# Patient Record
Sex: FEMALE | Race: WHITE | NOT HISPANIC OR LATINO | Employment: OTHER | URBAN - METROPOLITAN AREA
[De-identification: names, ages, dates, MRNs, and addresses within clinical notes are randomized per-mention and may not be internally consistent; named-entity substitution may affect disease eponyms.]

---

## 2017-06-11 ENCOUNTER — APPOINTMENT (EMERGENCY)
Dept: RADIOLOGY | Facility: HOSPITAL | Age: 82
End: 2017-06-11
Payer: MEDICARE

## 2017-06-11 ENCOUNTER — HOSPITAL ENCOUNTER (EMERGENCY)
Facility: HOSPITAL | Age: 82
Discharge: HOME | End: 2017-06-12
Attending: EMERGENCY MEDICINE | Admitting: EMERGENCY MEDICINE
Payer: MEDICARE

## 2017-06-11 VITALS
SYSTOLIC BLOOD PRESSURE: 147 MMHG | RESPIRATION RATE: 18 BRPM | DIASTOLIC BLOOD PRESSURE: 76 MMHG | OXYGEN SATURATION: 92 % | HEART RATE: 71 BPM | TEMPERATURE: 98 F | WEIGHT: 135 LBS

## 2017-06-11 DIAGNOSIS — S09.90XA HEAD INJURY, INITIAL ENCOUNTER: Primary | ICD-10-CM

## 2017-06-11 DIAGNOSIS — S00.81XA ABRASION OF FOREHEAD, INITIAL ENCOUNTER: ICD-10-CM

## 2017-06-11 PROCEDURE — 73110 X-RAY EXAM OF WRIST: CPT | Performed by: EMERGENCY MEDICINE

## 2017-06-11 PROCEDURE — 90715 TDAP VACCINE 7 YRS/> IM: CPT | Performed by: EMERGENCY MEDICINE

## 2017-06-11 PROCEDURE — 70450 CT HEAD/BRAIN W/O DYE: CPT

## 2017-06-11 PROCEDURE — 73090 X-RAY EXAM OF FOREARM: CPT | Performed by: EMERGENCY MEDICINE

## 2017-06-11 RX ADMIN — TETANUS TOXOID, REDUCED DIPHTHERIA TOXOID AND ACELLULAR PERTUSSIS VACCINE, ADSORBED 0.5 ML: 5; 2.5; 8; 8; 2.5 SUSPENSION INTRAMUSCULAR at 21:39

## 2017-06-12 PROCEDURE — 99284 EMERGENCY DEPT VISIT MOD MDM: CPT

## 2017-06-12 PROCEDURE — 90471 IMMUNIZATION ADMIN: CPT

## 2017-06-22 ENCOUNTER — APPOINTMENT (EMERGENCY)
Dept: RADIOLOGY | Facility: HOSPITAL | Age: 82
DRG: 684 | End: 2017-06-22
Payer: MEDICARE

## 2017-06-22 ENCOUNTER — HOSPITAL ENCOUNTER (INPATIENT)
Facility: HOSPITAL | Age: 82
LOS: 2 days | Discharge: HOME HEALTH | DRG: 684 | End: 2017-06-25
Attending: EMERGENCY MEDICINE | Admitting: INTERNAL MEDICINE
Payer: MEDICARE

## 2017-06-22 DIAGNOSIS — F03.90 DEMENTIA (HCC): ICD-10-CM

## 2017-06-22 DIAGNOSIS — N17.9 ACUTE KIDNEY INJURY (HCC): Primary | ICD-10-CM

## 2017-06-22 DIAGNOSIS — N17.9 AKI (ACUTE KIDNEY INJURY) (HCC): ICD-10-CM

## 2017-06-22 PROBLEM — I48.91 ATRIAL FIBRILLATION (HCC): Status: ACTIVE | Noted: 2017-06-22

## 2017-06-22 PROBLEM — J44.9 COPD (CHRONIC OBSTRUCTIVE PULMONARY DISEASE) (HCC): Status: ACTIVE | Noted: 2017-06-22

## 2017-06-22 PROBLEM — I10 HTN (HYPERTENSION), BENIGN: Status: ACTIVE | Noted: 2017-06-22

## 2017-06-22 LAB
ANION GAP SERPL CALCULATED.3IONS-SCNC: 10 MMOL/L (ref 4–13)
BASOPHILS # BLD AUTO: 0.04 THOUSANDS/ΜL (ref 0–0.1)
BASOPHILS NFR BLD AUTO: 0 % (ref 0–1)
BUN SERPL-MCNC: 44 MG/DL (ref 5–25)
CALCIUM SERPL-MCNC: 8.4 MG/DL (ref 8.3–10.1)
CHLORIDE SERPL-SCNC: 110 MMOL/L (ref 100–108)
CO2 SERPL-SCNC: 22 MMOL/L (ref 21–32)
CREAT SERPL-MCNC: 2.57 MG/DL (ref 0.6–1.3)
EOSINOPHIL # BLD AUTO: 0.23 THOUSAND/ΜL (ref 0–0.61)
EOSINOPHIL NFR BLD AUTO: 2 % (ref 0–6)
ERYTHROCYTE [DISTWIDTH] IN BLOOD BY AUTOMATED COUNT: 14.8 % (ref 11.6–15.1)
GFR SERPL CREATININE-BSD FRML MDRD: 17.6 ML/MIN/1.73SQ M
GLUCOSE SERPL-MCNC: 130 MG/DL (ref 65–140)
HCT VFR BLD AUTO: 34.7 % (ref 34.8–46.1)
HGB BLD-MCNC: 11.4 G/DL (ref 11.5–15.4)
LYMPHOCYTES # BLD AUTO: 0.93 THOUSANDS/ΜL (ref 0.6–4.47)
LYMPHOCYTES NFR BLD AUTO: 9 % (ref 14–44)
MCH RBC QN AUTO: 32.3 PG (ref 26.8–34.3)
MCHC RBC AUTO-ENTMCNC: 32.9 G/DL (ref 31.4–37.4)
MCV RBC AUTO: 98 FL (ref 82–98)
MONOCYTES # BLD AUTO: 0.55 THOUSAND/ΜL (ref 0.17–1.22)
MONOCYTES NFR BLD AUTO: 5 % (ref 4–12)
NEUTROPHILS # BLD AUTO: 8.33 THOUSANDS/ΜL (ref 1.85–7.62)
NEUTS SEG NFR BLD AUTO: 84 % (ref 43–75)
NRBC BLD AUTO-RTO: 0 /100 WBCS
PLATELET # BLD AUTO: 232 THOUSANDS/UL (ref 149–390)
PMV BLD AUTO: 10.2 FL (ref 8.9–12.7)
POTASSIUM SERPL-SCNC: 5.1 MMOL/L (ref 3.5–5.3)
RBC # BLD AUTO: 3.53 MILLION/UL (ref 3.81–5.12)
SODIUM SERPL-SCNC: 142 MMOL/L (ref 136–145)
WBC # BLD AUTO: 10.15 THOUSAND/UL (ref 4.31–10.16)

## 2017-06-22 PROCEDURE — 93005 ELECTROCARDIOGRAM TRACING: CPT | Performed by: EMERGENCY MEDICINE

## 2017-06-22 PROCEDURE — 71020 HB CHEST X-RAY 2VW FRONTAL&LATL: CPT

## 2017-06-22 PROCEDURE — 99285 EMERGENCY DEPT VISIT HI MDM: CPT

## 2017-06-22 PROCEDURE — 80048 BASIC METABOLIC PNL TOTAL CA: CPT | Performed by: EMERGENCY MEDICINE

## 2017-06-22 PROCEDURE — 96360 HYDRATION IV INFUSION INIT: CPT

## 2017-06-22 PROCEDURE — 36415 COLL VENOUS BLD VENIPUNCTURE: CPT | Performed by: EMERGENCY MEDICINE

## 2017-06-22 PROCEDURE — 85025 COMPLETE CBC W/AUTO DIFF WBC: CPT | Performed by: EMERGENCY MEDICINE

## 2017-06-22 RX ORDER — ISOSORBIDE DINITRATE 30 MG/1
30 TABLET ORAL
Status: DISCONTINUED | OUTPATIENT
Start: 2017-06-22 | End: 2017-06-25 | Stop reason: HOSPADM

## 2017-06-22 RX ORDER — ISOSORBIDE DINITRATE 30 MG/1
30 TABLET ORAL 4 TIMES DAILY
COMMUNITY
End: 2018-09-20 | Stop reason: ALTCHOICE

## 2017-06-22 RX ORDER — HEPARIN SODIUM 5000 [USP'U]/ML
5000 INJECTION, SOLUTION INTRAVENOUS; SUBCUTANEOUS EVERY 8 HOURS SCHEDULED
Status: DISCONTINUED | OUTPATIENT
Start: 2017-06-22 | End: 2017-06-25 | Stop reason: HOSPADM

## 2017-06-22 RX ORDER — FERROUS SULFATE 325(65) MG
325 TABLET ORAL
COMMUNITY
End: 2018-09-20 | Stop reason: ALTCHOICE

## 2017-06-22 RX ORDER — ASPIRIN 81 MG/1
81 TABLET ORAL DAILY
COMMUNITY
End: 2018-07-03 | Stop reason: SDUPTHER

## 2017-06-22 RX ORDER — ACETAMINOPHEN 325 MG/1
650 TABLET ORAL EVERY 6 HOURS PRN
Status: DISCONTINUED | OUTPATIENT
Start: 2017-06-22 | End: 2017-06-25 | Stop reason: HOSPADM

## 2017-06-22 RX ORDER — DIVALPROEX SODIUM 250 MG/1
500 TABLET, DELAYED RELEASE ORAL 2 TIMES DAILY
COMMUNITY
End: 2018-09-20 | Stop reason: ALTCHOICE

## 2017-06-22 RX ORDER — SODIUM CHLORIDE 9 MG/ML
125 INJECTION, SOLUTION INTRAVENOUS CONTINUOUS
Status: DISCONTINUED | OUTPATIENT
Start: 2017-06-22 | End: 2017-06-23

## 2017-06-22 RX ORDER — DIVALPROEX SODIUM 250 MG/1
250 TABLET, DELAYED RELEASE ORAL 3 TIMES DAILY
Status: DISCONTINUED | OUTPATIENT
Start: 2017-06-22 | End: 2017-06-25 | Stop reason: HOSPADM

## 2017-06-22 RX ORDER — SODIUM CHLORIDE 9 MG/ML
100 INJECTION, SOLUTION INTRAVENOUS CONTINUOUS
Status: DISCONTINUED | OUTPATIENT
Start: 2017-06-22 | End: 2017-06-25 | Stop reason: HOSPADM

## 2017-06-22 RX ORDER — AMLODIPINE BESYLATE 5 MG/1
5 TABLET ORAL DAILY
COMMUNITY
End: 2018-09-20 | Stop reason: ALTCHOICE

## 2017-06-22 RX ORDER — ASPIRIN 81 MG/1
81 TABLET ORAL DAILY
Status: DISCONTINUED | OUTPATIENT
Start: 2017-06-23 | End: 2017-06-25 | Stop reason: HOSPADM

## 2017-06-22 RX ORDER — DONEPEZIL HYDROCHLORIDE 5 MG/1
5 TABLET, FILM COATED ORAL
Status: DISCONTINUED | OUTPATIENT
Start: 2017-06-22 | End: 2017-06-25 | Stop reason: HOSPADM

## 2017-06-22 RX ORDER — AMLODIPINE BESYLATE 5 MG/1
5 TABLET ORAL DAILY
Status: DISCONTINUED | OUTPATIENT
Start: 2017-06-23 | End: 2017-06-25 | Stop reason: HOSPADM

## 2017-06-22 RX ORDER — FERROUS SULFATE 325(65) MG
325 TABLET ORAL
Status: DISCONTINUED | OUTPATIENT
Start: 2017-06-23 | End: 2017-06-25 | Stop reason: HOSPADM

## 2017-06-22 RX ORDER — DONEPEZIL HYDROCHLORIDE 5 MG/1
5 TABLET, FILM COATED ORAL
COMMUNITY
End: 2018-09-20 | Stop reason: ALTCHOICE

## 2017-06-22 RX ADMIN — ISOSORBIDE DINITRATE 30 MG: 30 TABLET ORAL at 23:37

## 2017-06-22 RX ADMIN — SODIUM CHLORIDE 500 ML: 0.9 INJECTION, SOLUTION INTRAVENOUS at 19:28

## 2017-06-22 RX ADMIN — SODIUM CHLORIDE 100 ML/HR: 0.9 INJECTION, SOLUTION INTRAVENOUS at 23:00

## 2017-06-22 RX ADMIN — DONEPEZIL HYDROCHLORIDE 5 MG: 5 TABLET, FILM COATED ORAL at 23:37

## 2017-06-22 RX ADMIN — DIVALPROEX SODIUM 250 MG: 250 TABLET, DELAYED RELEASE ORAL at 23:36

## 2017-06-22 RX ADMIN — HEPARIN SODIUM 5000 UNITS: 5000 INJECTION, SOLUTION INTRAVENOUS; SUBCUTANEOUS at 23:03

## 2017-06-22 RX ADMIN — METOPROLOL TARTRATE 25 MG: 25 TABLET ORAL at 23:02

## 2017-06-23 LAB
ANION GAP SERPL CALCULATED.3IONS-SCNC: 7 MMOL/L (ref 4–13)
ATRIAL RATE: 138 BPM
BUN SERPL-MCNC: 43 MG/DL (ref 5–25)
CALCIUM SERPL-MCNC: 8.3 MG/DL (ref 8.3–10.1)
CHLORIDE SERPL-SCNC: 112 MMOL/L (ref 100–108)
CO2 SERPL-SCNC: 24 MMOL/L (ref 21–32)
CREAT SERPL-MCNC: 2.17 MG/DL (ref 0.6–1.3)
ERYTHROCYTE [DISTWIDTH] IN BLOOD BY AUTOMATED COUNT: 14.8 % (ref 11.6–15.1)
GFR SERPL CREATININE-BSD FRML MDRD: 21.5 ML/MIN/1.73SQ M
GLUCOSE SERPL-MCNC: 91 MG/DL (ref 65–140)
HCT VFR BLD AUTO: 34 % (ref 34.8–46.1)
HGB BLD-MCNC: 11.1 G/DL (ref 11.5–15.4)
MCH RBC QN AUTO: 32.5 PG (ref 26.8–34.3)
MCHC RBC AUTO-ENTMCNC: 32.6 G/DL (ref 31.4–37.4)
MCV RBC AUTO: 99 FL (ref 82–98)
PLATELET # BLD AUTO: 218 THOUSANDS/UL (ref 149–390)
PMV BLD AUTO: 10.7 FL (ref 8.9–12.7)
POTASSIUM SERPL-SCNC: 5.3 MMOL/L (ref 3.5–5.3)
QRS AXIS: -8 DEGREES
QRSD INTERVAL: 80 MS
QT INTERVAL: 350 MS
QTC INTERVAL: 423 MS
RBC # BLD AUTO: 3.42 MILLION/UL (ref 3.81–5.12)
SODIUM SERPL-SCNC: 143 MMOL/L (ref 136–145)
T WAVE AXIS: 39 DEGREES
VENTRICULAR RATE: 88 BPM
WBC # BLD AUTO: 9.44 THOUSAND/UL (ref 4.31–10.16)

## 2017-06-23 PROCEDURE — G8988 SELF CARE GOAL STATUS: HCPCS

## 2017-06-23 PROCEDURE — G8987 SELF CARE CURRENT STATUS: HCPCS

## 2017-06-23 PROCEDURE — G8979 MOBILITY GOAL STATUS: HCPCS

## 2017-06-23 PROCEDURE — 80048 BASIC METABOLIC PNL TOTAL CA: CPT | Performed by: INTERNAL MEDICINE

## 2017-06-23 PROCEDURE — 97163 PT EVAL HIGH COMPLEX 45 MIN: CPT

## 2017-06-23 PROCEDURE — 85027 COMPLETE CBC AUTOMATED: CPT | Performed by: INTERNAL MEDICINE

## 2017-06-23 PROCEDURE — 97167 OT EVAL HIGH COMPLEX 60 MIN: CPT

## 2017-06-23 PROCEDURE — G8978 MOBILITY CURRENT STATUS: HCPCS

## 2017-06-23 RX ADMIN — METOPROLOL TARTRATE 25 MG: 25 TABLET ORAL at 17:49

## 2017-06-23 RX ADMIN — HEPARIN SODIUM 5000 UNITS: 5000 INJECTION, SOLUTION INTRAVENOUS; SUBCUTANEOUS at 22:28

## 2017-06-23 RX ADMIN — Medication 325 MG: at 08:29

## 2017-06-23 RX ADMIN — METOPROLOL TARTRATE 25 MG: 25 TABLET ORAL at 08:29

## 2017-06-23 RX ADMIN — HEPARIN SODIUM 5000 UNITS: 5000 INJECTION, SOLUTION INTRAVENOUS; SUBCUTANEOUS at 13:26

## 2017-06-23 RX ADMIN — ISOSORBIDE DINITRATE 30 MG: 30 TABLET ORAL at 08:31

## 2017-06-23 RX ADMIN — ASPIRIN 81 MG: 81 TABLET, COATED ORAL at 08:29

## 2017-06-23 RX ADMIN — ISOSORBIDE DINITRATE 30 MG: 30 TABLET ORAL at 16:41

## 2017-06-23 RX ADMIN — ISOSORBIDE DINITRATE 30 MG: 30 TABLET ORAL at 13:27

## 2017-06-23 RX ADMIN — AMLODIPINE BESYLATE 5 MG: 5 TABLET ORAL at 08:30

## 2017-06-23 RX ADMIN — DIVALPROEX SODIUM 250 MG: 250 TABLET, DELAYED RELEASE ORAL at 16:41

## 2017-06-23 RX ADMIN — DIVALPROEX SODIUM 250 MG: 250 TABLET, DELAYED RELEASE ORAL at 22:26

## 2017-06-23 RX ADMIN — DIVALPROEX SODIUM 250 MG: 250 TABLET, DELAYED RELEASE ORAL at 08:31

## 2017-06-23 RX ADMIN — DONEPEZIL HYDROCHLORIDE 5 MG: 5 TABLET, FILM COATED ORAL at 22:25

## 2017-06-24 LAB
ANION GAP SERPL CALCULATED.3IONS-SCNC: 7 MMOL/L (ref 4–13)
BASOPHILS # BLD AUTO: 0.05 THOUSANDS/ΜL (ref 0–0.1)
BASOPHILS NFR BLD AUTO: 1 % (ref 0–1)
BUN SERPL-MCNC: 35 MG/DL (ref 5–25)
CALCIUM SERPL-MCNC: 8.6 MG/DL (ref 8.3–10.1)
CHLORIDE SERPL-SCNC: 109 MMOL/L (ref 100–108)
CO2 SERPL-SCNC: 23 MMOL/L (ref 21–32)
CREAT SERPL-MCNC: 1.78 MG/DL (ref 0.6–1.3)
EOSINOPHIL # BLD AUTO: 0.37 THOUSAND/ΜL (ref 0–0.61)
EOSINOPHIL NFR BLD AUTO: 5 % (ref 0–6)
ERYTHROCYTE [DISTWIDTH] IN BLOOD BY AUTOMATED COUNT: 14.6 % (ref 11.6–15.1)
GFR SERPL CREATININE-BSD FRML MDRD: 27 ML/MIN/1.73SQ M
GLUCOSE SERPL-MCNC: 86 MG/DL (ref 65–140)
HCT VFR BLD AUTO: 35.8 % (ref 34.8–46.1)
HGB BLD-MCNC: 11.5 G/DL (ref 11.5–15.4)
LYMPHOCYTES # BLD AUTO: 2 THOUSANDS/ΜL (ref 0.6–4.47)
LYMPHOCYTES NFR BLD AUTO: 25 % (ref 14–44)
MCH RBC QN AUTO: 31.9 PG (ref 26.8–34.3)
MCHC RBC AUTO-ENTMCNC: 32.1 G/DL (ref 31.4–37.4)
MCV RBC AUTO: 99 FL (ref 82–98)
MONOCYTES # BLD AUTO: 1.04 THOUSAND/ΜL (ref 0.17–1.22)
MONOCYTES NFR BLD AUTO: 13 % (ref 4–12)
NEUTROPHILS # BLD AUTO: 4.39 THOUSANDS/ΜL (ref 1.85–7.62)
NEUTS SEG NFR BLD AUTO: 56 % (ref 43–75)
NRBC BLD AUTO-RTO: 0 /100 WBCS
PLATELET # BLD AUTO: 213 THOUSANDS/UL (ref 149–390)
PMV BLD AUTO: 10.1 FL (ref 8.9–12.7)
POTASSIUM SERPL-SCNC: 5.1 MMOL/L (ref 3.5–5.3)
RBC # BLD AUTO: 3.61 MILLION/UL (ref 3.81–5.12)
SODIUM SERPL-SCNC: 139 MMOL/L (ref 136–145)
WBC # BLD AUTO: 7.87 THOUSAND/UL (ref 4.31–10.16)

## 2017-06-24 PROCEDURE — 80048 BASIC METABOLIC PNL TOTAL CA: CPT | Performed by: NURSE PRACTITIONER

## 2017-06-24 PROCEDURE — 85025 COMPLETE CBC W/AUTO DIFF WBC: CPT | Performed by: NURSE PRACTITIONER

## 2017-06-24 RX ADMIN — ISOSORBIDE DINITRATE 30 MG: 30 TABLET ORAL at 11:30

## 2017-06-24 RX ADMIN — DIVALPROEX SODIUM 250 MG: 250 TABLET, DELAYED RELEASE ORAL at 21:13

## 2017-06-24 RX ADMIN — Medication 325 MG: at 09:04

## 2017-06-24 RX ADMIN — ASPIRIN 81 MG: 81 TABLET, COATED ORAL at 09:04

## 2017-06-24 RX ADMIN — HEPARIN SODIUM 5000 UNITS: 5000 INJECTION, SOLUTION INTRAVENOUS; SUBCUTANEOUS at 14:00

## 2017-06-24 RX ADMIN — DIVALPROEX SODIUM 250 MG: 250 TABLET, DELAYED RELEASE ORAL at 09:04

## 2017-06-24 RX ADMIN — AMLODIPINE BESYLATE 5 MG: 5 TABLET ORAL at 09:04

## 2017-06-24 RX ADMIN — DIVALPROEX SODIUM 250 MG: 250 TABLET, DELAYED RELEASE ORAL at 17:55

## 2017-06-24 RX ADMIN — HEPARIN SODIUM 5000 UNITS: 5000 INJECTION, SOLUTION INTRAVENOUS; SUBCUTANEOUS at 21:14

## 2017-06-24 RX ADMIN — DONEPEZIL HYDROCHLORIDE 5 MG: 5 TABLET, FILM COATED ORAL at 21:13

## 2017-06-24 RX ADMIN — ISOSORBIDE DINITRATE 30 MG: 30 TABLET ORAL at 21:12

## 2017-06-24 RX ADMIN — METOPROLOL TARTRATE 25 MG: 25 TABLET ORAL at 17:55

## 2017-06-24 RX ADMIN — METOPROLOL TARTRATE 25 MG: 25 TABLET ORAL at 09:04

## 2017-06-24 RX ADMIN — ISOSORBIDE DINITRATE 30 MG: 30 TABLET ORAL at 09:04

## 2017-06-25 VITALS
HEART RATE: 67 BPM | SYSTOLIC BLOOD PRESSURE: 190 MMHG | WEIGHT: 135 LBS | RESPIRATION RATE: 18 BRPM | HEIGHT: 65 IN | DIASTOLIC BLOOD PRESSURE: 77 MMHG | OXYGEN SATURATION: 92 % | BODY MASS INDEX: 22.49 KG/M2 | TEMPERATURE: 98.4 F

## 2017-06-25 LAB
ANION GAP SERPL CALCULATED.3IONS-SCNC: 8 MMOL/L (ref 4–13)
BUN SERPL-MCNC: 30 MG/DL (ref 5–25)
CALCIUM SERPL-MCNC: 8.9 MG/DL (ref 8.3–10.1)
CHLORIDE SERPL-SCNC: 109 MMOL/L (ref 100–108)
CO2 SERPL-SCNC: 25 MMOL/L (ref 21–32)
CREAT SERPL-MCNC: 1.71 MG/DL (ref 0.6–1.3)
GFR SERPL CREATININE-BSD FRML MDRD: 28.2 ML/MIN/1.73SQ M
GLUCOSE SERPL-MCNC: 92 MG/DL (ref 65–140)
POTASSIUM SERPL-SCNC: 4.5 MMOL/L (ref 3.5–5.3)
SODIUM SERPL-SCNC: 142 MMOL/L (ref 136–145)

## 2017-06-25 PROCEDURE — 80048 BASIC METABOLIC PNL TOTAL CA: CPT | Performed by: NURSE PRACTITIONER

## 2017-06-25 RX ADMIN — ASPIRIN 81 MG: 81 TABLET, COATED ORAL at 09:20

## 2017-06-25 RX ADMIN — DIVALPROEX SODIUM 250 MG: 250 TABLET, DELAYED RELEASE ORAL at 09:20

## 2017-06-25 RX ADMIN — ISOSORBIDE DINITRATE 30 MG: 30 TABLET ORAL at 09:20

## 2017-06-25 RX ADMIN — AMLODIPINE BESYLATE 5 MG: 5 TABLET ORAL at 09:20

## 2017-06-25 RX ADMIN — Medication 325 MG: at 09:20

## 2017-06-25 RX ADMIN — METOPROLOL TARTRATE 25 MG: 25 TABLET ORAL at 09:20

## 2017-07-01 ENCOUNTER — HOSPITAL ENCOUNTER (EMERGENCY)
Facility: HOSPITAL | Age: 82
Discharge: HOME | End: 2017-07-01
Attending: EMERGENCY MEDICINE | Admitting: EMERGENCY MEDICINE
Payer: MEDICARE

## 2017-07-01 ENCOUNTER — APPOINTMENT (EMERGENCY)
Dept: RADIOLOGY | Facility: HOSPITAL | Age: 82
End: 2017-07-01
Payer: MEDICARE

## 2017-07-01 VITALS
OXYGEN SATURATION: 96 % | SYSTOLIC BLOOD PRESSURE: 142 MMHG | WEIGHT: 135 LBS | HEART RATE: 60 BPM | DIASTOLIC BLOOD PRESSURE: 66 MMHG | BODY MASS INDEX: 22.49 KG/M2 | HEIGHT: 65 IN | RESPIRATION RATE: 18 BRPM | TEMPERATURE: 97.6 F

## 2017-07-01 DIAGNOSIS — R82.81 PYURIA: Primary | ICD-10-CM

## 2017-07-01 DIAGNOSIS — N17.9 AKI (ACUTE KIDNEY INJURY) (HCC): ICD-10-CM

## 2017-07-01 DIAGNOSIS — T83.9XXA FOLEY CATHETER PROBLEM, INITIAL ENCOUNTER (HCC): ICD-10-CM

## 2017-07-01 LAB
ANION GAP SERPL CALCULATED.3IONS-SCNC: 4 MMOL/L (ref 4–13)
BACTERIA UR QL AUTO: ABNORMAL /HPF
BASOPHILS # BLD AUTO: 0.04 THOUSANDS/ΜL (ref 0–0.1)
BASOPHILS NFR BLD AUTO: 1 % (ref 0–1)
BILIRUB UR QL STRIP: NEGATIVE
BUN SERPL-MCNC: 31 MG/DL (ref 5–25)
CALCIUM SERPL-MCNC: 8.5 MG/DL (ref 8.3–10.1)
CHLORIDE SERPL-SCNC: 105 MMOL/L (ref 100–108)
CLARITY UR: ABNORMAL
CO2 SERPL-SCNC: 28 MMOL/L (ref 21–32)
COLOR UR: YELLOW
CREAT SERPL-MCNC: 1.99 MG/DL (ref 0.6–1.3)
EOSINOPHIL # BLD AUTO: 0.37 THOUSAND/ΜL (ref 0–0.61)
EOSINOPHIL NFR BLD AUTO: 5 % (ref 0–6)
ERYTHROCYTE [DISTWIDTH] IN BLOOD BY AUTOMATED COUNT: 14.6 % (ref 11.6–15.1)
GFR SERPL CREATININE-BSD FRML MDRD: 23.7 ML/MIN/1.73SQ M
GLUCOSE SERPL-MCNC: 107 MG/DL (ref 65–140)
GLUCOSE UR STRIP-MCNC: NEGATIVE MG/DL
HCT VFR BLD AUTO: 35.4 % (ref 34.8–46.1)
HGB BLD-MCNC: 11.4 G/DL (ref 11.5–15.4)
HGB UR QL STRIP.AUTO: ABNORMAL
KETONES UR STRIP-MCNC: NEGATIVE MG/DL
LEUKOCYTE ESTERASE UR QL STRIP: ABNORMAL
LYMPHOCYTES # BLD AUTO: 1.35 THOUSANDS/ΜL (ref 0.6–4.47)
LYMPHOCYTES NFR BLD AUTO: 18 % (ref 14–44)
MCH RBC QN AUTO: 32.3 PG (ref 26.8–34.3)
MCHC RBC AUTO-ENTMCNC: 32.2 G/DL (ref 31.4–37.4)
MCV RBC AUTO: 100 FL (ref 82–98)
MONOCYTES # BLD AUTO: 1.06 THOUSAND/ΜL (ref 0.17–1.22)
MONOCYTES NFR BLD AUTO: 14 % (ref 4–12)
NEUTROPHILS # BLD AUTO: 4.62 THOUSANDS/ΜL (ref 1.85–7.62)
NEUTS SEG NFR BLD AUTO: 62 % (ref 43–75)
NITRITE UR QL STRIP: POSITIVE
NON-SQ EPI CELLS URNS QL MICRO: ABNORMAL /HPF
NRBC BLD AUTO-RTO: 0 /100 WBCS
PH UR STRIP.AUTO: 5.5 [PH] (ref 4.5–8)
PLATELET # BLD AUTO: 260 THOUSANDS/UL (ref 149–390)
PMV BLD AUTO: 10.2 FL (ref 8.9–12.7)
POTASSIUM SERPL-SCNC: 4.9 MMOL/L (ref 3.5–5.3)
PROT UR STRIP-MCNC: ABNORMAL MG/DL
RBC # BLD AUTO: 3.53 MILLION/UL (ref 3.81–5.12)
RBC #/AREA URNS AUTO: ABNORMAL /HPF
SODIUM SERPL-SCNC: 137 MMOL/L (ref 136–145)
SP GR UR STRIP.AUTO: 1.02 (ref 1–1.03)
UROBILINOGEN UR QL STRIP.AUTO: 0.2 E.U./DL
WBC # BLD AUTO: 7.46 THOUSAND/UL (ref 4.31–10.16)
WBC #/AREA URNS AUTO: ABNORMAL /HPF

## 2017-07-01 PROCEDURE — 87086 URINE CULTURE/COLONY COUNT: CPT

## 2017-07-01 PROCEDURE — 85025 COMPLETE CBC W/AUTO DIFF WBC: CPT | Performed by: EMERGENCY MEDICINE

## 2017-07-01 PROCEDURE — 93005 ELECTROCARDIOGRAM TRACING: CPT | Performed by: EMERGENCY MEDICINE

## 2017-07-01 PROCEDURE — 96365 THER/PROPH/DIAG IV INF INIT: CPT

## 2017-07-01 PROCEDURE — 87077 CULTURE AEROBIC IDENTIFY: CPT

## 2017-07-01 PROCEDURE — 81002 URINALYSIS NONAUTO W/O SCOPE: CPT | Performed by: EMERGENCY MEDICINE

## 2017-07-01 PROCEDURE — 87186 SC STD MICRODIL/AGAR DIL: CPT

## 2017-07-01 PROCEDURE — 96366 THER/PROPH/DIAG IV INF ADDON: CPT

## 2017-07-01 PROCEDURE — 70450 CT HEAD/BRAIN W/O DYE: CPT

## 2017-07-01 PROCEDURE — 80048 BASIC METABOLIC PNL TOTAL CA: CPT | Performed by: EMERGENCY MEDICINE

## 2017-07-01 PROCEDURE — 96360 HYDRATION IV INFUSION INIT: CPT

## 2017-07-01 PROCEDURE — 81001 URINALYSIS AUTO W/SCOPE: CPT

## 2017-07-01 PROCEDURE — 71020 HB CHEST X-RAY 2VW FRONTAL&LATL: CPT

## 2017-07-01 PROCEDURE — 99285 EMERGENCY DEPT VISIT HI MDM: CPT

## 2017-07-01 PROCEDURE — 36415 COLL VENOUS BLD VENIPUNCTURE: CPT | Performed by: EMERGENCY MEDICINE

## 2017-07-01 RX ORDER — DIAZEPAM 5 MG/ML
5 INJECTION, SOLUTION INTRAMUSCULAR; INTRAVENOUS ONCE
Status: DISCONTINUED | OUTPATIENT
Start: 2017-07-01 | End: 2017-07-01

## 2017-07-01 RX ORDER — CEPHALEXIN 500 MG/1
500 CAPSULE ORAL 2 TIMES DAILY
Qty: 20 CAPSULE | Refills: 0 | Status: SHIPPED | OUTPATIENT
Start: 2017-07-01 | End: 2017-07-11

## 2017-07-01 RX ADMIN — SODIUM CHLORIDE 500 ML: 0.9 INJECTION, SOLUTION INTRAVENOUS at 11:13

## 2017-07-01 RX ADMIN — CEFTRIAXONE 1000 MG: 1 INJECTION, POWDER, FOR SOLUTION INTRAMUSCULAR; INTRAVENOUS at 12:43

## 2017-07-02 LAB
ATRIAL RATE: 111 BPM
QRS AXIS: 56 DEGREES
QRSD INTERVAL: 78 MS
QT INTERVAL: 394 MS
QTC INTERVAL: 383 MS
T WAVE AXIS: 55 DEGREES
VENTRICULAR RATE: 57 BPM

## 2017-07-03 LAB — BACTERIA UR CULT: NORMAL

## 2017-07-05 ENCOUNTER — ALLSCRIPTS OFFICE VISIT (OUTPATIENT)
Dept: OTHER | Facility: OTHER | Age: 82
End: 2017-07-05

## 2017-07-10 ENCOUNTER — ALLSCRIPTS OFFICE VISIT (OUTPATIENT)
Dept: OTHER | Facility: OTHER | Age: 82
End: 2017-07-10

## 2017-07-10 ENCOUNTER — GENERIC CONVERSION - ENCOUNTER (OUTPATIENT)
Dept: OTHER | Facility: OTHER | Age: 82
End: 2017-07-10

## 2017-07-27 ENCOUNTER — ALLSCRIPTS OFFICE VISIT (OUTPATIENT)
Dept: OTHER | Facility: OTHER | Age: 82
End: 2017-07-27

## 2017-08-03 ENCOUNTER — HOSPITAL ENCOUNTER (EMERGENCY)
Facility: HOSPITAL | Age: 82
Discharge: OTHER INSTITUTION | End: 2017-08-04
Attending: EMERGENCY MEDICINE | Admitting: EMERGENCY MEDICINE
Payer: MEDICARE

## 2017-08-03 ENCOUNTER — APPOINTMENT (EMERGENCY)
Dept: RADIOLOGY | Facility: HOSPITAL | Age: 82
End: 2017-08-03
Payer: MEDICARE

## 2017-08-03 DIAGNOSIS — Z00.00 GENERAL MEDICAL EXAMINATION: ICD-10-CM

## 2017-08-03 DIAGNOSIS — R40.4 SPELL OF ALTERED CONSCIOUSNESS: Primary | ICD-10-CM

## 2017-08-03 DIAGNOSIS — F03.90 DEMENTIA (HCC): ICD-10-CM

## 2017-08-03 LAB
ALBUMIN SERPL BCP-MCNC: 3.3 G/DL (ref 3.5–5)
ALP SERPL-CCNC: 63 U/L (ref 46–116)
ALT SERPL W P-5'-P-CCNC: 15 U/L (ref 12–78)
ANION GAP SERPL CALCULATED.3IONS-SCNC: 8 MMOL/L (ref 4–13)
AST SERPL W P-5'-P-CCNC: 23 U/L (ref 5–45)
BACTERIA UR QL AUTO: ABNORMAL /HPF
BASOPHILS # BLD AUTO: 0.03 THOUSANDS/ΜL (ref 0–0.1)
BASOPHILS NFR BLD AUTO: 1 % (ref 0–1)
BILIRUB SERPL-MCNC: 0.46 MG/DL (ref 0.2–1)
BILIRUB UR QL STRIP: NEGATIVE
BUN SERPL-MCNC: 41 MG/DL (ref 5–25)
CALCIUM SERPL-MCNC: 8.6 MG/DL (ref 8.3–10.1)
CHLORIDE SERPL-SCNC: 108 MMOL/L (ref 100–108)
CLARITY UR: CLEAR
CO2 SERPL-SCNC: 26 MMOL/L (ref 21–32)
COLOR UR: YELLOW
CREAT SERPL-MCNC: 2.12 MG/DL (ref 0.6–1.3)
EOSINOPHIL # BLD AUTO: 0.16 THOUSAND/ΜL (ref 0–0.61)
EOSINOPHIL NFR BLD AUTO: 3 % (ref 0–6)
ERYTHROCYTE [DISTWIDTH] IN BLOOD BY AUTOMATED COUNT: 13.4 % (ref 11.6–15.1)
GFR SERPL CREATININE-BSD FRML MDRD: 20 ML/MIN/1.73SQ M
GLUCOSE SERPL-MCNC: 122 MG/DL (ref 65–140)
GLUCOSE UR STRIP-MCNC: NEGATIVE MG/DL
HCT VFR BLD AUTO: 35.7 % (ref 34.8–46.1)
HGB BLD-MCNC: 12.1 G/DL (ref 11.5–15.4)
HGB UR QL STRIP.AUTO: ABNORMAL
HYALINE CASTS #/AREA URNS LPF: ABNORMAL /LPF
KETONES UR STRIP-MCNC: ABNORMAL MG/DL
LEUKOCYTE ESTERASE UR QL STRIP: ABNORMAL
LYMPHOCYTES # BLD AUTO: 1.03 THOUSANDS/ΜL (ref 0.6–4.47)
LYMPHOCYTES NFR BLD AUTO: 17 % (ref 14–44)
MCH RBC QN AUTO: 33.3 PG (ref 26.8–34.3)
MCHC RBC AUTO-ENTMCNC: 33.9 G/DL (ref 31.4–37.4)
MCV RBC AUTO: 98 FL (ref 82–98)
MONOCYTES # BLD AUTO: 0.82 THOUSAND/ΜL (ref 0.17–1.22)
MONOCYTES NFR BLD AUTO: 14 % (ref 4–12)
NEUTROPHILS # BLD AUTO: 3.93 THOUSANDS/ΜL (ref 1.85–7.62)
NEUTS SEG NFR BLD AUTO: 65 % (ref 43–75)
NITRITE UR QL STRIP: NEGATIVE
NON-SQ EPI CELLS URNS QL MICRO: ABNORMAL /HPF
NRBC BLD AUTO-RTO: 0 /100 WBCS
PH UR STRIP.AUTO: 7 [PH] (ref 4.5–8)
PLATELET # BLD AUTO: 144 THOUSANDS/UL (ref 149–390)
PMV BLD AUTO: 11 FL (ref 8.9–12.7)
POTASSIUM SERPL-SCNC: 4.5 MMOL/L (ref 3.5–5.3)
PROT SERPL-MCNC: 7.3 G/DL (ref 6.4–8.2)
PROT UR STRIP-MCNC: ABNORMAL MG/DL
RBC # BLD AUTO: 3.63 MILLION/UL (ref 3.81–5.12)
RBC #/AREA URNS AUTO: ABNORMAL /HPF
SODIUM SERPL-SCNC: 142 MMOL/L (ref 136–145)
SP GR UR STRIP.AUTO: 1.02 (ref 1–1.03)
SPECIMEN SOURCE: NORMAL
TROPONIN I BLD-MCNC: 0.01 NG/ML (ref 0–0.08)
UROBILINOGEN UR QL STRIP.AUTO: 1 E.U./DL
WBC # BLD AUTO: 6 THOUSAND/UL (ref 4.31–10.16)
WBC #/AREA URNS AUTO: ABNORMAL /HPF

## 2017-08-03 PROCEDURE — 87086 URINE CULTURE/COLONY COUNT: CPT

## 2017-08-03 PROCEDURE — 87077 CULTURE AEROBIC IDENTIFY: CPT

## 2017-08-03 PROCEDURE — 80053 COMPREHEN METABOLIC PANEL: CPT | Performed by: EMERGENCY MEDICINE

## 2017-08-03 PROCEDURE — 96360 HYDRATION IV INFUSION INIT: CPT

## 2017-08-03 PROCEDURE — 71020 HB CHEST X-RAY 2VW FRONTAL&LATL: CPT

## 2017-08-03 PROCEDURE — 81002 URINALYSIS NONAUTO W/O SCOPE: CPT | Performed by: EMERGENCY MEDICINE

## 2017-08-03 PROCEDURE — 87186 SC STD MICRODIL/AGAR DIL: CPT

## 2017-08-03 PROCEDURE — 85025 COMPLETE CBC W/AUTO DIFF WBC: CPT | Performed by: EMERGENCY MEDICINE

## 2017-08-03 PROCEDURE — 93005 ELECTROCARDIOGRAM TRACING: CPT | Performed by: EMERGENCY MEDICINE

## 2017-08-03 PROCEDURE — 81001 URINALYSIS AUTO W/SCOPE: CPT

## 2017-08-03 PROCEDURE — 36415 COLL VENOUS BLD VENIPUNCTURE: CPT | Performed by: EMERGENCY MEDICINE

## 2017-08-03 PROCEDURE — 84484 ASSAY OF TROPONIN QUANT: CPT

## 2017-08-03 RX ORDER — LISINOPRIL 20 MG/1
20 TABLET ORAL DAILY
COMMUNITY
End: 2018-09-20 | Stop reason: ALTCHOICE

## 2017-08-03 RX ORDER — BISACODYL 10 MG
10 SUPPOSITORY, RECTAL RECTAL DAILY
COMMUNITY
End: 2019-01-22 | Stop reason: SDUPTHER

## 2017-08-03 RX ORDER — QUETIAPINE FUMARATE 25 MG/1
12.5 TABLET, FILM COATED ORAL 2 TIMES DAILY
COMMUNITY
End: 2018-09-20 | Stop reason: ALTCHOICE

## 2017-08-03 RX ORDER — POLYVINYL ALCOHOL 14 MG/ML
1 SOLUTION/ DROPS OPHTHALMIC AS NEEDED
COMMUNITY
End: 2019-01-22 | Stop reason: SDUPTHER

## 2017-08-03 RX ORDER — LOPERAMIDE HYDROCHLORIDE 2 MG/1
2 CAPSULE ORAL 4 TIMES DAILY PRN
COMMUNITY
End: 2018-06-27 | Stop reason: SDUPTHER

## 2017-08-03 RX ADMIN — SODIUM CHLORIDE 500 ML: 0.9 INJECTION, SOLUTION INTRAVENOUS at 18:56

## 2017-08-04 VITALS
SYSTOLIC BLOOD PRESSURE: 152 MMHG | BODY MASS INDEX: 22.73 KG/M2 | OXYGEN SATURATION: 97 % | RESPIRATION RATE: 18 BRPM | WEIGHT: 134.48 LBS | HEART RATE: 76 BPM | DIASTOLIC BLOOD PRESSURE: 81 MMHG | TEMPERATURE: 98.1 F

## 2017-08-04 PROCEDURE — 99285 EMERGENCY DEPT VISIT HI MDM: CPT

## 2017-08-05 LAB
ATRIAL RATE: 357 BPM
BACTERIA UR CULT: NORMAL
QRS AXIS: -13 DEGREES
QRSD INTERVAL: 86 MS
QT INTERVAL: 354 MS
QTC INTERVAL: 411 MS
T WAVE AXIS: 34 DEGREES
VENTRICULAR RATE: 81 BPM

## 2018-01-12 VITALS
HEIGHT: 65 IN | SYSTOLIC BLOOD PRESSURE: 120 MMHG | DIASTOLIC BLOOD PRESSURE: 68 MMHG | WEIGHT: 130 LBS | BODY MASS INDEX: 21.66 KG/M2 | HEART RATE: 82 BPM

## 2018-01-13 VITALS
WEIGHT: 137 LBS | HEART RATE: 64 BPM | DIASTOLIC BLOOD PRESSURE: 64 MMHG | BODY MASS INDEX: 22.82 KG/M2 | HEIGHT: 65 IN | SYSTOLIC BLOOD PRESSURE: 110 MMHG

## 2018-01-22 VITALS
SYSTOLIC BLOOD PRESSURE: 142 MMHG | BODY MASS INDEX: 22.49 KG/M2 | HEART RATE: 76 BPM | HEIGHT: 65 IN | DIASTOLIC BLOOD PRESSURE: 80 MMHG | WEIGHT: 135 LBS

## 2018-01-24 NOTE — PROGRESS NOTES
Chief Complaint  Void Trial; Fox removed in am at [facility]  PVR in pm      Active Problems    1  Afib (427 31) (I48 91)   2  COPD (chronic obstructive pulmonary disease) (496) (J44 9)   3  Dementia (294 20) (F03 90)   4  Hypertension (401 9) (I10)   5  Urinary retention (788 20) (R33 9)   6  UTI (urinary tract infection) (599 0) (N39 0)    Current Meds   1  Adult Aspirin Low Strength 81 MG TBDP; Therapy: (Recorded:85Uhu1975) to Recorded   2  AmLODIPine Besylate 5 MG Oral Tablet; Therapy: (Recorded:94Nmx1187) to Recorded   3  Artificial Tears 1 4 % Ophthalmic Solution; Therapy: (Recorded:12Yrl6164) to Recorded   4  Bisacodyl 10 MG Rectal Suppository; Therapy: (Recorded:67Dou2721) to Recorded   5  Cephalexin 500 MG Oral Capsule; Therapy: (Recorded:91Nuo9112) to Recorded   6  Divalproex Sodium 250 MG Oral Tablet Delayed Release; Therapy: (Recorded:48Kqz3680) to Recorded   7  Donepezil HCl - 5 MG Oral Tablet; Therapy: (Recorded:87Wlu6399) to Recorded   8  Enema Rectal Enema; Therapy: (Recorded:28Rmz3089) to Recorded   9  Ferrous Sulfate 325 (65 Fe) MG Oral Tablet; Therapy: (Recorded:54Xoq9684) to Recorded   10  Imodium 2 MG CAPS (Loperamide HCl); Therapy: (Recorded:78Van3155) to Recorded   11  Isosorbide Dinitrate 30 MG Oral Tablet; Therapy: (Recorded:81Cjs9716) to Recorded   12  Metoprolol Tartrate 25 MG Oral Tablet; Therapy: (Recorded:90Bfu6324) to Recorded   13  Milk of Magnesia 400 MG/5ML Oral Suspension; Therapy: (Recorded:79Cim5693) to Recorded    Allergies    1  No Known Drug Allergies    Vitals  Signs    Heart Rate: 76  Systolic: 921  Diastolic: 80  Height: 5 ft 5 in  Weight: 135 lb   BMI Calculated: 22 47  BSA Calculated: 1 67    Procedure    Procedure: Bladder Ultrasound Post Void Residual    Test indication: Urinary Retention  Equipment And Procedure: The patient voided not measured  U/S Findings:  89ml  Assessment    1   Urinary retention (788 20) (R33 9)    Plan  Urinary retention    · Follow-up visit in 2 weeks Evaluation and Treatment  Follow-up in 2 weeks with [facility]   Status: Hold For - Scheduling,Retrospective By Protocol Authorization  Requested for:  [id number]   Ordered;  For: Urinary retention; Ordered Enrique Coon Performed:  Due: 51IZC8475    Future Appointments    Date/Time Provider Specialty Site   07/27/2017 09:15 AM Berta Solorzano [facility] [address]     Signatures   Electronically signed by : Livia Lewis RN; Jul 10 2017  2:58PM EST                       (Author)    Electronically signed by : RONA Chappell Res ; Jul 13 2017  2:53PM EST

## 2018-06-27 DIAGNOSIS — R19.7 DIARRHEA DUE TO MALABSORPTION: Primary | ICD-10-CM

## 2018-06-27 DIAGNOSIS — K90.9 DIARRHEA DUE TO MALABSORPTION: Primary | ICD-10-CM

## 2018-06-27 RX ORDER — LOPERAMIDE HYDROCHLORIDE 2 MG/1
CAPSULE ORAL
Qty: 30 CAPSULE | Refills: 3 | Status: SHIPPED | OUTPATIENT
Start: 2018-06-27 | End: 2019-01-22 | Stop reason: SDUPTHER

## 2018-07-03 DIAGNOSIS — I10 HYPERTENSION, UNSPECIFIED TYPE: Primary | ICD-10-CM

## 2018-07-05 RX ORDER — ASPIRIN 81 MG
TABLET, DELAYED RELEASE (ENTERIC COATED) ORAL
Qty: 30 TABLET | Refills: 3 | Status: SHIPPED | OUTPATIENT
Start: 2018-07-05 | End: 2018-10-16 | Stop reason: SDUPTHER

## 2018-09-20 ENCOUNTER — IN HOME VISIT (OUTPATIENT)
Dept: GERIATRICS | Age: 83
End: 2018-09-20
Payer: MEDICARE

## 2018-09-20 VITALS — HEART RATE: 54 BPM | DIASTOLIC BLOOD PRESSURE: 88 MMHG | SYSTOLIC BLOOD PRESSURE: 132 MMHG | TEMPERATURE: 97.5 F

## 2018-09-20 DIAGNOSIS — I48.20 CHRONIC ATRIAL FIBRILLATION (HCC): ICD-10-CM

## 2018-09-20 DIAGNOSIS — I10 HTN (HYPERTENSION), BENIGN: ICD-10-CM

## 2018-09-20 DIAGNOSIS — G93.89 BRAIN MASS: ICD-10-CM

## 2018-09-20 DIAGNOSIS — F03.90 DEMENTIA WITHOUT BEHAVIORAL DISTURBANCE, UNSPECIFIED DEMENTIA TYPE (HCC): Primary | ICD-10-CM

## 2018-09-20 PROCEDURE — 99335 PR DOM/R-HOME E/M EST PT LW MOD SEVERITY 25 MINUTES: CPT | Performed by: NURSE PRACTITIONER

## 2018-09-20 NOTE — PROGRESS NOTES
Assessment/Plan:    No problem-specific Assessment & Plan notes found for this encounter  Diagnoses and all orders for this visit:    Dementia without behavioral disturbance, unspecified dementia type  Comments:  -resides in secured unit for safety  -medications reviewed    Chronic atrial fibrillation ([facility] [address] )  Comments:  -aspirin therapy  -metoprolol 25mg BID    HTN (hypertension), benign  Comments:  -controlled with metoprolol 25mg BID    Brain mass  Comments:  -previously on hospice but no longer receiving hospice services          Subjective:      Patient ID: Dominique Rowan is a 80 y o  female  80year old female presents for routine exam  Staff and patient offer no concerns  Pt resides on secured dementia unit for safety  She continues to ambulate independently  The following portions of the patient's history were reviewed and updated as appropriate: allergies, current medications and problem list     Review of Systems   Constitutional: Negative for chills and fever  HENT: Negative for congestion and sore throat  Eyes: Negative for pain  Respiratory: Negative for cough and shortness of breath  Cardiovascular: Negative for chest pain and leg swelling  Gastrointestinal: Negative for abdominal pain, constipation, diarrhea, nausea and vomiting  Genitourinary: Negative for dysuria  Musculoskeletal: Negative for back pain  Skin: Negative for color change  Neurological: Negative for dizziness and headaches  Psychiatric/Behavioral: Negative for behavioral problems  Objective:      /88 (BP Location: Right arm, Patient Position: Sitting, Cuff Size: Standard)   Pulse (!) 54   Temp 97 5 °F (36 4 °C)          Physical Exam   Constitutional: She is oriented to person, place, and time  She is cooperative  No distress  HENT:   Head: Normocephalic and atraumatic  Eyes: Conjunctivae are normal    Neck: No JVD present  No thyromegaly present     Cardiovascular: Normal heart sounds  An irregularly irregular rhythm present  Bradycardia present  Pulmonary/Chest: Effort normal and breath sounds normal  No tachypnea  No respiratory distress  She has no wheezes  She has no rales  She exhibits no tenderness  Abdominal: Soft  Bowel sounds are normal  She exhibits no distension  There is no tenderness  There is no guarding  Musculoskeletal: She exhibits no edema or tenderness  Lymphadenopathy:     She has no cervical adenopathy  Neurological: She is alert and oriented to person, place, and time  Alert and oriented to place (town) and time (year and month)   Skin: Skin is warm and dry  She is not diaphoretic  Psychiatric: Her behavior is normal  Her mood appears anxious  She is not agitated and not aggressive  She expresses impulsivity

## 2018-10-16 DIAGNOSIS — I10 HYPERTENSION, UNSPECIFIED TYPE: ICD-10-CM

## 2018-10-18 RX ORDER — ASPIRIN 81 MG
TABLET, DELAYED RELEASE (ENTERIC COATED) ORAL
Qty: 28 TABLET | Refills: 0 | Status: SHIPPED | OUTPATIENT
Start: 2018-10-18 | End: 2018-11-01 | Stop reason: SDUPTHER

## 2018-11-01 DIAGNOSIS — I10 HYPERTENSION, UNSPECIFIED TYPE: ICD-10-CM

## 2018-11-02 RX ORDER — ASPIRIN 81 MG
TABLET, DELAYED RELEASE (ENTERIC COATED) ORAL
Qty: 28 TABLET | Refills: 10 | Status: SHIPPED | OUTPATIENT
Start: 2018-11-02 | End: 2019-01-22 | Stop reason: SDUPTHER

## 2018-11-08 ENCOUNTER — IN HOME VISIT (OUTPATIENT)
Dept: GERIATRICS | Age: 83
End: 2018-11-08
Payer: MEDICARE

## 2018-11-08 VITALS
DIASTOLIC BLOOD PRESSURE: 88 MMHG | HEART RATE: 74 BPM | TEMPERATURE: 98.6 F | RESPIRATION RATE: 16 BRPM | SYSTOLIC BLOOD PRESSURE: 150 MMHG

## 2018-11-08 DIAGNOSIS — F03.90 DEMENTIA WITHOUT BEHAVIORAL DISTURBANCE, UNSPECIFIED DEMENTIA TYPE (HCC): ICD-10-CM

## 2018-11-08 DIAGNOSIS — Z91.81 RISK FOR FALLS: ICD-10-CM

## 2018-11-08 DIAGNOSIS — R58 BLEEDING: Primary | ICD-10-CM

## 2018-11-08 PROCEDURE — 99336 PR DOM/R-HOME E/M EST PT MOD HI SEVERITY 40 MINUTES: CPT | Performed by: NURSE PRACTITIONER

## 2018-11-08 NOTE — PROGRESS NOTES
Assessment/Plan:    No problem-specific Assessment & Plan notes found for this encounter  Diagnoses and all orders for this visit:    Bleeding  Comments:  -per family reports  -source unknown  -hematest stools x 3, UA C&S, and CBC      Dementia without behavioral disturbance, unspecified dementia type  Comments:  -resides in secured unit  -dementia contirbuting factor to fall risk  -poor historian due to dementia  -medications reviewed-is on ASA    Risk for falls  Comments:  -observed ambulating with folded up walker in hand  -does not follow commands to use walker for ambulation  -dementia contributing factor to fall risk    Other orders  -     Sodium Phosphates (ENEMA RE); Insert 1 application into the rectum as needed for constipation If no BM in 5 days          Subjective:      Patient ID: Ernesto Sterling is a 80 y o  female  80year old female being seen for family reports to [facility] staff of blood either in urine or stool, they are unsure  Per [facility] staff, they have not witnessed any blood in stool or urine  Pt has dementia and resides in secured unit  Dementia causes pt to be poor historian  The following portions of the patient's history were reviewed and updated as appropriate: allergies, current medications and problem list     Review of Systems   Unable to perform ROS: Dementia   Gastrointestinal: Negative for abdominal pain, blood in stool, constipation, diarrhea, nausea and vomiting  Genitourinary: Negative for dysuria  Objective:      /88 (BP Location: Right arm, Patient Position: Sitting, Cuff Size: Standard)   Pulse 74   Temp 98 6 °F (37 °C)   Resp 16          Physical Exam   Constitutional: She is cooperative  No distress  HENT:   Head: Normocephalic and atraumatic  Eyes: Conjunctivae are normal  Right eye exhibits no discharge  Left eye exhibits no discharge  Neck: No JVD present  No thyromegaly present  Cardiovascular: Normal rate and normal heart sounds    An irregularly irregular rhythm present  Pulmonary/Chest: Effort normal and breath sounds normal  No tachypnea  No respiratory distress  She has no wheezes  She has no rales  She exhibits no tenderness  No cough  No SOB   Abdominal: Soft  Bowel sounds are normal  She exhibits no distension and no mass  There is no tenderness  There is no guarding  Genitourinary:   Genitourinary Comments: No suprapubic tenderness   Musculoskeletal: She exhibits no edema  Observed ambulating with folded up walker in her hand  Does not follow command to use walker  Wide gait stance   Lymphadenopathy:     She has no cervical adenopathy  Neurological: She is alert  Cooperative with exam   Skin: Skin is warm and dry  She is not diaphoretic  Psychiatric: She has a normal mood and affect   Her behavior is normal

## 2018-11-15 ENCOUNTER — IN HOME VISIT (OUTPATIENT)
Dept: GERIATRICS | Age: 83
End: 2018-11-15
Payer: MEDICARE

## 2018-11-15 DIAGNOSIS — I10 HTN (HYPERTENSION), BENIGN: Primary | ICD-10-CM

## 2018-11-15 DIAGNOSIS — F03.90 DEMENTIA WITHOUT BEHAVIORAL DISTURBANCE, UNSPECIFIED DEMENTIA TYPE (HCC): ICD-10-CM

## 2018-11-15 DIAGNOSIS — I48.20 CHRONIC ATRIAL FIBRILLATION (HCC): ICD-10-CM

## 2018-11-15 DIAGNOSIS — R31.29 OTHER MICROSCOPIC HEMATURIA: ICD-10-CM

## 2018-11-15 PROCEDURE — 99336 PR DOM/R-HOME E/M EST PT MOD HI SEVERITY 40 MINUTES: CPT | Performed by: NURSE PRACTITIONER

## 2018-11-15 NOTE — PROGRESS NOTES
Assessment/Plan:    No problem-specific Assessment & Plan notes found for this encounter  Diagnoses and all orders for this visit:    HTN (hypertension), benign  Comments:  -controlled with metporpolol 25mg BID    Chronic atrial fibrillation (HCC)  Comments:  -on daily aspirin  -meds reviewed    Dementia without behavioral disturbance, unspecified dementia type  Comments:  -resides in secured unit  -DME paperwork completed    Other microscopic hematuria  Comments:  -asymptomatic  -no CVA tenderness  -C&S showed 10,000-40,000 citrobactor freundii  -left message for son, Kyree Has, to return call  Want urology consult? Subjective:      Patient ID: Donna Horvath is a 80 y o  female  80year old female being seen for annual exam and completion of DME paperwork  Pt last seen on 11/8/18 for bleeding observed by family  They were unsure if it was urine or stool which they saw the blood from  UA C&S done and showed 10,000-40,000 bacteria count  Pt asymptomatic  UA showed blood  No other concerns presented  The following portions of the patient's history were reviewed and updated as appropriate: allergies, current medications and problem list     Review of Systems   Constitutional: Negative for chills and fever  HENT: Negative for congestion and sore throat  Eyes: Negative for pain  Respiratory: Negative for cough and shortness of breath  Cardiovascular: Negative for chest pain and leg swelling  Gastrointestinal: Negative for abdominal pain, constipation, diarrhea, nausea and vomiting  Genitourinary: Negative for dysuria, flank pain, frequency and hematuria  Musculoskeletal: Negative for back pain  Skin: Negative for color change  Neurological: Negative for dizziness and headaches  Psychiatric/Behavioral: Negative for behavioral problems  All other systems reviewed and are negative  Objective: There were no vitals taken for this visit           Physical Exam Constitutional: She is oriented to person, place, and time  She appears well-developed  She is cooperative  No distress  HENT:   Head: Normocephalic and atraumatic  Eyes: Conjunctivae are normal  Right eye exhibits no discharge  Left eye exhibits no discharge  Neck: No JVD present  No thyromegaly present  Cardiovascular: Normal rate and normal heart sounds  An irregularly irregular rhythm present  Pulmonary/Chest: Effort normal and breath sounds normal  No tachypnea  No respiratory distress  She has no wheezes  She has no rales  She exhibits no tenderness  Abdominal: Soft  She exhibits no distension  There is no tenderness  Genitourinary:   Genitourinary Comments: No suprapubic tenderness  No CVA tenderness   Musculoskeletal: She exhibits edema  She exhibits no tenderness  Trace pitting edema to B/L ankles  No calf tenderness   Lymphadenopathy:     She has no cervical adenopathy  Neurological: She is alert and oriented to person, place, and time  Skin: Skin is warm and dry  She is not diaphoretic  Psychiatric: She has a normal mood and affect   Her behavior is normal

## 2018-11-16 ENCOUNTER — TELEPHONE (OUTPATIENT)
Dept: GERIATRICS | Age: 83
End: 2018-11-16

## 2018-11-21 DIAGNOSIS — F41.9 ANXIETY: Primary | ICD-10-CM

## 2018-11-21 RX ORDER — LORAZEPAM 0.5 MG/1
0.5 TABLET ORAL ONCE
Qty: 1 TABLET | Refills: 0 | Status: SHIPPED | OUTPATIENT
Start: 2018-11-21 | End: 2019-09-28 | Stop reason: HOSPADM

## 2019-01-18 ENCOUNTER — APPOINTMENT (EMERGENCY)
Dept: RADIOLOGY | Facility: HOSPITAL | Age: 84
End: 2019-01-18
Payer: MEDICARE

## 2019-01-18 ENCOUNTER — HOSPITAL ENCOUNTER (EMERGENCY)
Facility: HOSPITAL | Age: 84
Discharge: NURSING, REHAB OR LONG-TERM CARE | End: 2019-01-18
Attending: EMERGENCY MEDICINE
Payer: MEDICARE

## 2019-01-18 VITALS
DIASTOLIC BLOOD PRESSURE: 104 MMHG | OXYGEN SATURATION: 98 % | HEIGHT: 65 IN | TEMPERATURE: 98.6 F | HEART RATE: 71 BPM | SYSTOLIC BLOOD PRESSURE: 152 MMHG | BODY MASS INDEX: 24.83 KG/M2 | WEIGHT: 149 LBS | RESPIRATION RATE: 30 BRPM

## 2019-01-18 LAB
BASE EXCESS BLDA CALC-SCNC: -1 MMOL/L (ref -2–3)
CA-I BLD-SCNC: 1.11 MMOL/L (ref 1.12–1.32)
GLUCOSE SERPL-MCNC: 151 MG/DL (ref 65–140)
HCO3 BLDA-SCNC: 24.5 MMOL/L (ref 24–30)
HCT VFR BLD CALC: 40 % (ref 34.8–46.1)
HGB BLDA-MCNC: 13.6 G/DL (ref 11.5–15.4)
HOLD SPECIMEN: NORMAL
HOLD SPECIMEN: NORMAL
PCO2 BLD: 26 MMOL/L (ref 21–32)
PCO2 BLD: 41 MM HG (ref 42–50)
PH BLD: 7.38 [PH] (ref 7.3–7.4)
PO2 BLD: 18 MM HG (ref 35–45)
POTASSIUM BLD-SCNC: 4.1 MMOL/L (ref 3.5–5.3)
SAO2 % BLD FROM PO2: 26 % (ref 95–98)
SODIUM BLD-SCNC: 141 MMOL/L (ref 136–145)
SPECIMEN SOURCE: ABNORMAL

## 2019-01-18 PROCEDURE — 99284 EMERGENCY DEPT VISIT MOD MDM: CPT

## 2019-01-18 PROCEDURE — 36415 COLL VENOUS BLD VENIPUNCTURE: CPT

## 2019-01-18 PROCEDURE — 82803 BLOOD GASES ANY COMBINATION: CPT

## 2019-01-18 PROCEDURE — 74177 CT ABD & PELVIS W/CONTRAST: CPT

## 2019-01-18 PROCEDURE — 84132 ASSAY OF SERUM POTASSIUM: CPT

## 2019-01-18 PROCEDURE — 70450 CT HEAD/BRAIN W/O DYE: CPT

## 2019-01-18 PROCEDURE — 96374 THER/PROPH/DIAG INJ IV PUSH: CPT

## 2019-01-18 PROCEDURE — 85014 HEMATOCRIT: CPT

## 2019-01-18 PROCEDURE — 72125 CT NECK SPINE W/O DYE: CPT

## 2019-01-18 PROCEDURE — 82330 ASSAY OF CALCIUM: CPT

## 2019-01-18 PROCEDURE — 82947 ASSAY GLUCOSE BLOOD QUANT: CPT

## 2019-01-18 PROCEDURE — 71260 CT THORAX DX C+: CPT

## 2019-01-18 PROCEDURE — 84295 ASSAY OF SERUM SODIUM: CPT

## 2019-01-18 PROCEDURE — 99282 EMERGENCY DEPT VISIT SF MDM: CPT | Performed by: EMERGENCY MEDICINE

## 2019-01-18 RX ORDER — LOPERAMIDE HYDROCHLORIDE 2 MG/1
2 CAPSULE ORAL AS NEEDED
COMMUNITY

## 2019-01-18 RX ORDER — ACETAMINOPHEN 500 MG
500 TABLET ORAL EVERY 6 HOURS PRN
COMMUNITY

## 2019-01-18 RX ORDER — POLYVINYL ALCOHOL 14 MG/ML
1 SOLUTION/ DROPS OPHTHALMIC AS NEEDED
COMMUNITY
End: 2019-03-05 | Stop reason: ALTCHOICE

## 2019-01-18 RX ORDER — ASPIRIN 81 MG/1
81 TABLET ORAL DAILY
COMMUNITY
End: 2019-09-28 | Stop reason: HOSPADM

## 2019-01-18 RX ORDER — HYDRALAZINE HYDROCHLORIDE 20 MG/ML
5 INJECTION INTRAMUSCULAR; INTRAVENOUS EVERY 6 HOURS PRN
Status: DISCONTINUED | OUTPATIENT
Start: 2019-01-18 | End: 2019-01-18 | Stop reason: HOSPADM

## 2019-01-18 RX ORDER — BISACODYL 10 MG
10 SUPPOSITORY, RECTAL RECTAL DAILY
COMMUNITY
End: 2019-09-28 | Stop reason: HOSPADM

## 2019-01-18 RX ADMIN — IOHEXOL 100 ML: 350 INJECTION, SOLUTION INTRAVENOUS at 13:34

## 2019-01-18 RX ADMIN — METOPROLOL TARTRATE 25 MG: 25 TABLET, FILM COATED ORAL at 14:38

## 2019-01-18 RX ADMIN — HYDRALAZINE HYDROCHLORIDE 5 MG: 20 INJECTION INTRAMUSCULAR; INTRAVENOUS at 15:17

## 2019-01-18 NOTE — H&P
H&P Exam - Trauma   O[place]ron [place] [de-identified] 80 y o  female MRN: [id number]  Unit/Bed#:  Encounter: [id number]    Assessment/Plan   Trauma Alert: Level B  Model of Arrival: Ambulance  Trauma Team: Attending Dollie Holstein and AP [address] [place] [address] PA-C  Consultants: None    Trauma Active Problems:   Fall on ASA    Trauma Plan:   CT neck negative for fracture - cervical collar cleared  Discharge back to [facility]    Chief Complaint: neck pain    History of Present Illness   HPI:   Danny Calderon is an 79 y/o  female who presents with and unwitnessed fall at her nursing home  She is demented at baseline and has been calm en route  She complains of neck pain  She takes ASA and nursing home staff reports a head strike  Mechanism:Fall    Review of Systems   Constitutional: Negative  HENT: Negative  Eyes: Negative  Respiratory: Negative  Cardiovascular: Negative  Gastrointestinal: Negative  Endocrine: Negative  Genitourinary: Negative  Musculoskeletal: Positive for neck pain  Skin: Negative  Allergic/Immunologic: Negative  Neurological: Negative  Hematological: Negative  Psychiatric/Behavioral: Negative  Historical Information   History is unobtainable from the patient due to dementia    Efforts to obtain history included the following sources: other medical personnel      PMH:   Dementia  Atrial fibrillation  H/o UTI  HTN  Uterine prolapse  CKD        Social History:    no tobacco or Etoh      Last Tetanus: unknown  Family History: Non-contributory        Meds/Allergies   all current active meds have been reviewed    Allergies not on file      PHYSICAL EXAM      Objective   Vitals:   First set:      Primary Survey:   (A) Airway: intact  (B) Breathing: equal B/L   (C) Circulation: Pulses:   normal  (D) Disabliity:  GCS Total:  15, Eye Opening:   Spontaneous = 4, Motor Response: Obeys commands = 6 and Verbal Response:  Oriented = 5  (E) Expose: Completed    Secondary Survey: (Click on Physical Exam tab above)  Physical Exam   Constitutional: She is oriented to person, place, and time  She appears well-developed and well-nourished  HENT:   Head: Normocephalic and atraumatic  Right Ear: External ear normal    Left Ear: External ear normal    Eyes: Pupils are equal, round, and reactive to light  Neck: Normal range of motion  No tracheal deviation present  Cardiovascular: Normal rate  Pulmonary/Chest: Effort normal and breath sounds normal  No respiratory distress  She has no wheezes  Abdominal: Soft  Bowel sounds are normal  She exhibits no distension  There is no tenderness  There is no rebound  Musculoskeletal: She exhibits no edema or deformity  C and T spine tenderness; no L spine tenderness; no step offs or deformities   Neurological: She is alert and oriented to person, place, and time  No cranial nerve deficit  Skin: Skin is warm and dry  Invasive Devices          No matching active lines, drains, or airways          Lab Results: Results: I have personally reviewed pertinent reports  and ISTAT: No components found for: VBG  Imaging/EKG Studies: Results: I have personally reviewed pertinent reports      Other Studies: FAST negative    Code Status: No Order  Advance Directive and Living Will:      Power of :    POLST:

## 2019-01-18 NOTE — ED NOTES
Called [facility] for transport back to nursing home, was told no transports are available until 1900 tomorrow night        Jaquelin Spencer RN  01/18/19 9914

## 2019-01-18 NOTE — ED NOTES
[facility] can transport pt back @ 8966         Jael Blue RN  01/18/19 9166

## 2019-01-18 NOTE — ED PROVIDER NOTES
Emergency Department Airway Evaluation and Management Form    History  Obtained from:EMS  Patient has no allergy information on record  No chief complaint on file  HPI  Patient had a unwitnessed fall with possible strike to the head per the nursing home she patient did hit her head and had some complaint of head pain per EMS she denied hitting her head but had some pain on the back of her head when they palpated  No past medical history on file  No past surgical history on file  No family history on file  Social History   Substance Use Topics    Smoking status: Not on file    Smokeless tobacco: Not on file    Alcohol use Not on file     I have reviewed and agree with the history as documented  Review of Systems  Unable due to acuity  Physical Exam  BP (!) 209/104   Pulse 81   Temp 98 6 °F (37 °C) (Tympanic)   Resp 19   SpO2 94%     Physical Exam  Alert moving all extremities patient removing her own clothes on no obvious injury noted    ED Medications  Medications - No data to display    Intubation  Procedures    Notes  No intubation at this time    CritCare Time    Final Diagnosis  Final diagnoses:   None   head injury on thinners    ED Provider  Electronically Signed by     Melvi Wisdom MD  01/18/19 5015

## 2019-01-18 NOTE — SOCIAL WORK
Cm responded to trauma alert  Pt was brought to the ED via [facility] s/p unwitnessed fall at [facility]  Pt had positive head strike   Cm spoke to pt's son Dennie Blazing [phone] who would like phone updates

## 2019-01-18 NOTE — DISCHARGE INSTRUCTIONS
Fall Prevention   WHAT YOU NEED TO KNOW:   Fall prevention includes ways to make your home and other areas safer  It also includes ways you can move more carefully to prevent a fall  Health conditions that cause changes in your blood pressure, vision, or muscle strength and coordination may increase your risk for falls  Medicines may also increase your risk for falls if they make you dizzy, weak, or sleepy  DISCHARGE INSTRUCTIONS:   Call 911 or have someone else call if:   · You have fallen and are unconscious  · You have fallen and cannot move part of your body  Contact your healthcare provider if:   · You have fallen and have pain or a headache  · You have questions or concerns about your condition or care  Fall prevention tips:   · Stand or sit up slowly  This may help you keep your balance and prevent falls  · Use assistive devices as directed  Your healthcare provider may suggest that you use a cane or walker to help you keep your balance  You may need to have grab bars put in your bathroom near the toilet or in the shower  · Wear shoes that fit well and have soles that   Wear shoes both inside and outside  Use slippers with good   Do not wear shoes with high heels  · Wear a personal alarm  This is a device that allows you to call 911 if you fall and need help  Ask your healthcare provider for more information  · Stay active  Exercise can help strengthen your muscles and improve your balance  Your healthcare provider may recommend water aerobics or walking  He or she may also recommend physical therapy to improve your coordination  Never start an exercise program without talking to your healthcare provider first      · Manage your medical conditions  Keep all appointments with your healthcare providers  Visit your eye doctor as directed  Home safety tips:   · Add items to prevent falls in the bathroom    Put nonslip strips on your bath or shower floor to prevent you from slipping  Use a bath mat if you do not have carpet in the bathroom  This will prevent you from falling when you step out of the bath or shower  Use a shower seat so you do not need to stand while you shower  Sit on the toilet or a chair in your bathroom to dry yourself and put on clothing  This will prevent you from losing your balance from drying or dressing yourself while you are standing  · Keep paths clear  Remove books, shoes, and other objects from walkways and stairs  Place cords for telephones and lamps out of the way so that you do not need to walk over them  Tape them down if you cannot move them  Remove small rugs  If you cannot remove a rug, secure it with double-sided tape  This will prevent you from tripping  · Install bright lights in your home  Use night lights to help light paths to the bathroom or kitchen  Always turn on the light before you start walking  · Keep items you use often on shelves within reach  Do not use a step stool to help you reach an item  · Paint or place reflective tape on the edges of your stairs  This will help you see the stairs better  Follow up with your healthcare provider as directed:  Write down your questions so you remember to ask them during your visits  © 2017 [employer] Information is for End User's use only and may not be sold, redistributed or otherwise used for commercial purposes  All illustrations and images included in CareNotes® are the copyrighted property of A D A M , Inc  or [employer]  The above information is an  only  It is not intended as medical advice for individual conditions or treatments  Talk to your doctor, nurse or pharmacist before following any medical regimen to see if it is safe and effective for you

## 2019-01-18 NOTE — SOCIAL WORK
CM received a phone call from pt's son Joe Halsted [phone]   His brother will transport the pt back to [facility] JANIYA lazaro @9717

## 2019-01-18 NOTE — ED NOTES
Pts son will come and pick her up @1900, cancelled transport with [facility]        Bautista Nugent, RN  01/18/19 0074

## 2019-01-22 ENCOUNTER — OFFICE VISIT (OUTPATIENT)
Dept: GERIATRICS | Age: 84
End: 2019-01-22
Payer: MEDICARE

## 2019-01-22 VITALS
HEART RATE: 85 BPM | SYSTOLIC BLOOD PRESSURE: 162 MMHG | TEMPERATURE: 97.5 F | DIASTOLIC BLOOD PRESSURE: 90 MMHG | RESPIRATION RATE: 18 BRPM

## 2019-01-22 DIAGNOSIS — R29.6 UNWITNESSED FALL: ICD-10-CM

## 2019-01-22 DIAGNOSIS — R05.9 COUGH: ICD-10-CM

## 2019-01-22 DIAGNOSIS — F03.90 DEMENTIA WITHOUT BEHAVIORAL DISTURBANCE, UNSPECIFIED DEMENTIA TYPE (HCC): ICD-10-CM

## 2019-01-22 DIAGNOSIS — R09.89 RALES: Primary | ICD-10-CM

## 2019-01-22 PROCEDURE — 99336 PR DOM/R-HOME E/M EST PT MOD HI SEVERITY 40 MINUTES: CPT | Performed by: NURSE PRACTITIONER

## 2019-01-22 NOTE — PROGRESS NOTES
Assessment/Plan:    No problem-specific Assessment & Plan notes found for this encounter  Diagnoses and all orders for this visit:    Kj  Comments:  -left base  check CXR to r/o pneumonia      Cough  Comments:  -PRN Robitussin ordered    Unwitnessed fall  Comments:  -evaluated in ER and had imaging studies done    Dementia without behavioral disturbance, unspecified dementia type  Comments:  -resides in secured unit  -contributes to poor judgement and poor safety awareness          Subjective:      Patient ID: Santino Reynoso is a 80 y o  female  80year old female resides in secured dementia unit at [place] [address]  Being seen for ER follow up for fall and also for staff reports of cough and congestion  Had unwitnessed fall on 1/18/19 and was sent to [facility] 2[facility] ER for evaluation  Had multiple trauma xrays, CXR, CT scan of head, neck, and thoracic spine  Also had CT scan of chest, abdomen and pelvis  Staff report pt is not feeling well and is having a cough  Via fax this morning, she was started on PRN Robitussin for cough  At that time, staff reported she was afebrile  The following portions of the patient's history were reviewed and updated as appropriate: allergies, current medications and problem list     Review of Systems   Constitutional: Negative for chills and fever  HENT: Negative for congestion, ear pain, rhinorrhea, sinus pain, sinus pressure and sore throat  Eyes: Negative for pain  Respiratory: Positive for cough  Negative for shortness of breath  Cardiovascular: Negative for chest pain and leg swelling  Gastrointestinal: Negative for abdominal pain, constipation, diarrhea, nausea and vomiting  Genitourinary: Negative for dysuria  Musculoskeletal: Negative for back pain  Skin: Negative for color change  Neurological: Negative for dizziness and headaches  Psychiatric/Behavioral: Negative for behavioral problems     All other systems reviewed and are negative  Objective:      /90 (BP Location: Right arm, Patient Position: Supine, Cuff Size: Standard)   Pulse 85   Temp 97 5 °F (36 4 °C)   Resp 18          Physical Exam   Constitutional: No distress  HENT:   Head: Normocephalic  No pain or lumps with palpation of skull   Eyes: Right eye exhibits no discharge  Left eye exhibits no discharge  Neck: No JVD present  No thyromegaly present  Cardiovascular: Normal rate  An irregularly irregular rhythm present  Pulmonary/Chest: Effort normal  No respiratory distress  She has no wheezes  She has rales in the left lower field  She exhibits no tenderness  occasionall nonproductive cough   Abdominal: Soft  She exhibits no distension  There is no tenderness  There is no guarding  Musculoskeletal: She exhibits no edema or tenderness  Right shoulder: She exhibits no pain  No pain with palpation of skull, spine, paraspinal muscles, shoulders, clavicles, elbows, wrists, hips, knees, or ankles  Moves all extremities without pain   Lymphadenopathy:     She has no cervical adenopathy  Neurological: She is alert  Follows commands   Skin: Skin is warm and dry  She is not diaphoretic     Psychiatric: Her behavior is normal    Pleasant and cooperative

## 2019-01-31 ENCOUNTER — IN HOME VISIT (OUTPATIENT)
Dept: GERIATRICS | Age: 84
End: 2019-01-31
Payer: MEDICARE

## 2019-01-31 DIAGNOSIS — R60.9 EDEMA, UNSPECIFIED TYPE: Primary | ICD-10-CM

## 2019-01-31 PROCEDURE — 99335 PR DOM/R-HOME E/M EST PT LW MOD SEVERITY 25 MINUTES: CPT | Performed by: NURSE PRACTITIONER

## 2019-01-31 NOTE — PROGRESS NOTES
Assessment/Plan:    No problem-specific Assessment & Plan notes found for this encounter  Diagnoses and all orders for this visit:    Edema, unspecified type  Comments:  -minimal interstitial edema as shown on CXR on 1/23/19  -rales left base have resolved after course of lasix 20mg x 3 days  -BMP from 1/28/19 reviewed    Other orders  -     guaiFENesin (ROBITUSSIN) 100 MG/5ML oral liquid; Take 200 mg by mouth every 4 (four) hours as needed          Subjective:      Patient ID: Fabrice Johnson is a 80 y o  female  80year old female being seen for follow up of left base rales, with CXR on 1/23/19 that showed minimal interstitial edema  Completed 3 day course of furosemide 20mg daily and had BMP drawn on 1/28/19  Staff and pt offer no concerns  The following portions of the patient's history were reviewed and updated as appropriate: allergies, current medications and problem list     Review of Systems   Constitutional: Negative for chills and fever  Respiratory: Negative for cough and shortness of breath  Cardiovascular: Negative for chest pain  Gastrointestinal: Negative for abdominal pain  Genitourinary: Negative for difficulty urinating and dysuria  Musculoskeletal: Negative for arthralgias  All other systems reviewed and are negative  Objective: There were no vitals taken for this visit  Physical Exam   Constitutional: She is cooperative  No distress  HENT:   Head: Normocephalic and atraumatic  Eyes: Right eye exhibits no discharge  Left eye exhibits no discharge  Neck: No JVD present  Cardiovascular: Normal rate and normal heart sounds  An irregularly irregular rhythm present  Pulmonary/Chest: Effort normal and breath sounds normal  No tachypnea  No respiratory distress  She has no wheezes  She has no rales  She exhibits no tenderness  No cough  No SOB   Abdominal: Soft  Bowel sounds are normal  She exhibits no distension  There is no tenderness  Musculoskeletal: She exhibits no edema or tenderness  Lymphadenopathy:     She has no cervical adenopathy  Neurological: She is alert  Skin: Skin is warm and dry  She is not diaphoretic  Psychiatric: She has a normal mood and affect   Her behavior is normal

## 2019-02-07 ENCOUNTER — TELEPHONE (OUTPATIENT)
Dept: GERIATRICS | Age: 84
End: 2019-02-07

## 2019-02-07 NOTE — TELEPHONE ENCOUNTER
Alina Gillette would like to discuss the upcoming Urology appt on 2/13  Would like your help in understanding what the point is, if they are not wanting tp pursue surgeries and or dialysis  Also wants to know about transport- I advised to call Neelima De Leon in wellness for that today  Last as a reminder, if she does keep the appointment, she is to be given a dose of lorazepam 30 minutes before appointment time, or before leaving for appointment  Rhonda Foote [phone]  Alina Gillette [facility] [phone]      Advised call would be Friday 2/8/19

## 2019-02-08 NOTE — TELEPHONE ENCOUNTER
Andreina's telephone number did not ring  Kimani's cell phone number rang  Attempted to discuss  He asked that I call back in an hour because he is in a meeting  I will do my best to accommodate his request but made no guarantees

## 2019-02-11 PROBLEM — R33.9 URINARY RETENTION: Status: ACTIVE | Noted: 2019-02-11

## 2019-02-11 NOTE — PROGRESS NOTES
2/13/2019      Chief Complaint   Patient presents with    Urinary Retention     PVR       Assessment and Plan    80 y o  female managed by Dr Krzysztof Bo    1  Urinary retention  - bladder scan 514mL, did not void prior    - U/S 11/19/18 with no hydronpehrosis and patient asymptomatic, will continue to monitor    - FU 1 year with PVR       History of Present Illness  Santino Reynoso is a 80 y o  female here for follow up evaluation of urinary retention  The patient presents today with no complaints  She is incontinent of urine and wears depends on a daily basis  Her bladder scan in the office today is 514 mL  She did not void prior to this  Her baseline is in the 300s  She did have an ultrasound done in November of 2018 which revealed no hydronephrosis  She denies any urinary tract infections or bladder pain  Review of Systems   Constitutional: Negative for activity change, chills and fever  Gastrointestinal: Negative for abdominal distention and abdominal pain  Musculoskeletal: Negative for back pain and gait problem  Psychiatric/Behavioral: Negative for behavioral problems and confusion  Urinary Incontinence Screening      Most Recent Value   Urinary Incontinence   Urinary Incontinence? Yes [Wears depends 24 hrs ]          Past Medical History  Past Medical History:   Diagnosis Date    A-fib ([facility] [address] )     CKD (chronic kidney disease)     COPD (chronic obstructive pulmonary disease) (HCC)     Dementia     Hypertension     Urinary tract infection     Uterine prolapse        Past Social History  History reviewed  No pertinent surgical history  Social History     Tobacco Use   Smoking Status Never Smoker   Smokeless Tobacco Never Used       Past Family History  Family History   Problem Relation Age of Onset    No Known Problems Father     No Known Problems Mother        Past Social history  Social History     Socioeconomic History    Marital status:       Spouse name: Not on file  Number of children: Not on file    Years of education: Not on file    Highest education level: Not on file   Occupational History    Not on file   Social Needs    Financial resource strain: Not on file    Food insecurity:     Worry: Not on file     Inability: Not on file    Transportation needs:     Medical: Not on file     Non-medical: Not on file   Tobacco Use    Smoking status: Never Smoker    Smokeless tobacco: Never Used   Substance and Sexual Activity    Alcohol use: No    Drug use: No    Sexual activity: Not on file   Lifestyle    Physical activity:     Days per week: Not on file     Minutes per session: Not on file    Stress: Not on file   Relationships    Social connections:     Talks on phone: Not on file     Gets together: Not on file     Attends Shinto service: Not on file     Active member of club or organization: Not on file     Attends meetings of clubs or organizations: Not on file     Relationship status: Not on file    Intimate partner violence:     Fear of current or ex partner: Not on file     Emotionally abused: Not on file     Physically abused: Not on file     Forced sexual activity: Not on file   Other Topics Concern    Not on file   Social History Narrative    ** Merged History Encounter **            Current Medications  Current Outpatient Medications   Medication Sig Dispense Refill    acetaminophen (TYLENOL) 500 mg tablet Take 500 mg by mouth every 6 (six) hours as needed for mild pain      aspirin (ECOTRIN LOW STRENGTH) 81 mg EC tablet Take 81 mg by mouth daily      bisacodyl (BISACODYL LAXATIVE) 10 mg suppository Insert 10 mg into the rectum daily      guaiFENesin (ROBITUSSIN) 100 MG/5ML oral liquid Take 200 mg by mouth every 4 (four) hours as needed      loperamide (IMODIUM) 2 mg capsule Take 2 mg by mouth as needed for diarrhea Take 1 dose after each bowel movement *max dose 16mg      LORazepam (ATIVAN) 0 5 mg tablet Take 1 tablet (0 5 mg total) by mouth once for 1 dose 1/2 hour before urology appointment 1 tablet 0    magnesium hydroxide (MILK OF MAGNESIA) 400 mg/5 mL oral suspension Take by mouth daily as needed for constipation      metoprolol tartrate (LOPRESSOR) 25 mg tablet Take 25 mg by mouth every 12 (twelve) hours      polyvinyl alcohol (ARTIFICIAL TEARS) 1 4 % ophthalmic solution 1 drop as needed for dry eyes      Sodium Phosphates (ENEMA RE) Insert 1 application into the rectum as needed for constipation If no BM in 5 days       No current facility-administered medications for this visit  Allergies  Allergies   Allergen Reactions    No Active Allergies          The following portions of the patient's history were reviewed and updated as appropriate: allergies, current medications, past medical history, past social history, past surgical history and problem list       Vitals  There were no vitals filed for this visit  Physical Exam  Constitutional   General appearance: Patient is seated and in no acute distress, well appearing and well nourished  Head and Face   Head and face: Normal     Eyes   Conjunctiva and lids: No erythema, swelling or discharge  Ears, Nose, Mouth, and Throat   Hearing: Normal     Pulmonary   Respiratory effort: No increased work of breathing or signs of respiratory distress  Cardiovascular   Examination of extremities for edema and/or varicosities: Normal     Abdomen   Abdomen: Non-tender, no masses  Musculoskeletal   Gait and station: Wheel chair bound  Skin   Skin and subcutaneous tissue: Warm, dry, and intact  No visible lesions or rashes    Psychiatric   Judgment and insight: Normal  Recent and remote memory:  Normal  Mood and affect: Normal      Results  Recent Results (from the past 1 hour(s))   POCT Measure PVR    Collection Time: 02/13/19 11:40 AM   Result Value Ref Range    POST-VOID RESIDUAL VOLUME, ML  mL   ]  No results found for: PSA  Lab Results   Component Value Date    GLUCOSE 151 (H) 01/18/2019    CALCIUM 8 6 08/03/2017    K 4 5 08/03/2017    CO2 26 01/18/2019     08/03/2017    BUN 41 (H) 08/03/2017    CREATININE 2 12 (H) 08/03/2017     Lab Results   Component Value Date    WBC 6 00 08/03/2017    HGB 13 6 01/18/2019    HCT 40 01/18/2019    MCV 98 08/03/2017     (L) 08/03/2017       Orders  Orders Placed This Encounter   Procedures    POCT Measure PVR

## 2019-02-13 ENCOUNTER — OFFICE VISIT (OUTPATIENT)
Dept: UROLOGY | Facility: AMBULATORY SURGERY CENTER | Age: 84
End: 2019-02-13
Payer: MEDICARE

## 2019-02-13 DIAGNOSIS — R33.9 URINARY RETENTION: Primary | ICD-10-CM

## 2019-02-13 LAB — POST-VOID RESIDUAL VOLUME, ML POC: 514 ML

## 2019-02-13 PROCEDURE — 51798 US URINE CAPACITY MEASURE: CPT | Performed by: PHYSICIAN ASSISTANT

## 2019-02-13 PROCEDURE — 99213 OFFICE O/P EST LOW 20 MIN: CPT | Performed by: PHYSICIAN ASSISTANT

## 2019-03-05 ENCOUNTER — IN HOME VISIT (OUTPATIENT)
Dept: GERIATRICS | Facility: CLINIC | Age: 84
End: 2019-03-05
Payer: MEDICARE

## 2019-03-05 VITALS
SYSTOLIC BLOOD PRESSURE: 176 MMHG | DIASTOLIC BLOOD PRESSURE: 92 MMHG | TEMPERATURE: 97.8 F | HEART RATE: 58 BPM | RESPIRATION RATE: 16 BRPM

## 2019-03-05 DIAGNOSIS — I10 HTN (HYPERTENSION), BENIGN: Primary | ICD-10-CM

## 2019-03-05 DIAGNOSIS — I48.20 CHRONIC ATRIAL FIBRILLATION (HCC): ICD-10-CM

## 2019-03-05 DIAGNOSIS — J44.9 CHRONIC OBSTRUCTIVE PULMONARY DISEASE, UNSPECIFIED COPD TYPE (HCC): ICD-10-CM

## 2019-03-05 PROCEDURE — 99335 PR DOM/R-HOME E/M EST PT LW MOD SEVERITY 25 MINUTES: CPT | Performed by: NURSE PRACTITIONER

## 2019-03-05 RX ORDER — LISINOPRIL 5 MG/1
2.5 TABLET ORAL DAILY
Qty: 30 TABLET | Refills: 1 | Status: SHIPPED | OUTPATIENT
Start: 2019-03-05

## 2019-03-05 NOTE — PROGRESS NOTES
Assessment/Plan:    No problem-specific Assessment & Plan notes found for this encounter  Diagnoses and all orders for this visit:    HTN (hypertension), benign  Comments:  -BPs reviewed and have been elevated SBPs 170s  -pt asymptomatic  -continues metorpolol 25mg BID  Will not increase as HR 50s  -start lisinopril 2 5mg daily  Orders:  -     lisinopril (ZESTRIL) 5 mg tablet; Take 0 5 tablets (2 5 mg total) by mouth daily    Chronic atrial fibrillation (HCC)  Comments:  -aspirin 81mg daily  -rate controlled with metoprolol 25mg BID    Chronic obstructive pulmonary disease, unspecified COPD type ([facility] )  Comments:  -pulse ox on RA 96%  -medications reviewed          Subjective:      Patient ID: Julia Handley is a 80 y o  female  80year old female being seen for elevated BP readings  Also being seen because son is concerned that patient has had a decline  Is asking if hospice is appropriate  Pt has been in hospice in past but at one point no longer qualified and was not recertified  She does have a dx of brain mass  She resides in secured dementia unit at [facility] staff report she has not been walking for the past week  However, when they bring her out to the common area for our visit, pt is observed walking independently with walker  Staff report it has been a few days since they saw her walking like this  She does so without pain or difficulty  She is also observed at the lunch table eating well and feeding herself  She is pleasant and cooperative  Telephone call placed to son, Romeo Yoon, at [phone], and voicemail message left for him to return call to discuss mother  The following portions of the patient's history were reviewed and updated as appropriate: allergies, current medications and problem list     Review of Systems   Constitutional: Negative for chills and fever  HENT: Negative for congestion and sore throat  Eyes: Negative for pain     Respiratory: Negative for cough and shortness of breath  Cardiovascular: Negative for chest pain and leg swelling  Gastrointestinal: Negative for abdominal pain, constipation, diarrhea, nausea and vomiting  Genitourinary: Negative for dysuria  Musculoskeletal: Negative for back pain  Skin: Negative for color change  Neurological: Negative for dizziness and headaches  Psychiatric/Behavioral: Negative for behavioral problems  All other systems reviewed and are negative  Objective:      BP (!) 176/92 (BP Location: Right arm, Patient Position: Sitting, Cuff Size: Standard)   Pulse 58   Temp 97 8 °F (36 6 °C)   Resp 16 Comment: pulse ox 96% on RA         Physical Exam   Constitutional: She is cooperative  No distress  HENT:   Head: Normocephalic and atraumatic  Right Ear: External ear normal    Left Ear: External ear normal    Nose: Nose normal    Eyes: Conjunctivae are normal  Right eye exhibits no discharge  Left eye exhibits no discharge  Neck: No JVD present  No thyromegaly present  Cardiovascular: Normal rate and normal heart sounds  An irregularly irregular rhythm present  Pulmonary/Chest: Effort normal and breath sounds normal  No tachypnea  No respiratory distress  She has no wheezes  She has no rales  She exhibits no tenderness  No cough  No SOB at rest   Abdominal: Soft  She exhibits no distension  There is no tenderness  There is no guarding  Musculoskeletal: She exhibits no edema or tenderness  Moves all extremities without difficulty or pain  Ambulates independently with walker   Lymphadenopathy:     She has no cervical adenopathy  Neurological: She is alert  Skin: Skin is warm and dry  She is not diaphoretic  Psychiatric: She has a normal mood and affect   Her behavior is normal    Pleasant, cooperative with exam

## 2019-03-07 ENCOUNTER — TELEPHONE (OUTPATIENT)
Dept: GERIATRICS | Facility: CLINIC | Age: 84
End: 2019-03-07

## 2019-05-16 ENCOUNTER — HOSPITAL ENCOUNTER (OUTPATIENT)
Facility: HOSPITAL | Age: 84
Setting detail: OBSERVATION
Discharge: HOSPICE | End: 2019-05-18
Attending: EMERGENCY MEDICINE | Admitting: HOSPITALIST
Payer: MEDICARE

## 2019-05-16 ENCOUNTER — APPOINTMENT (EMERGENCY)
Dept: CT IMAGING | Facility: HOSPITAL | Age: 84
End: 2019-05-16
Payer: MEDICARE

## 2019-05-16 DIAGNOSIS — N81.4 UTERINE PROLAPSE: Primary | ICD-10-CM

## 2019-05-16 DIAGNOSIS — N39.0 URINARY TRACT INFECTION: ICD-10-CM

## 2019-05-16 DIAGNOSIS — N30.00 ACUTE CYSTITIS WITHOUT HEMATURIA: ICD-10-CM

## 2019-05-16 DIAGNOSIS — N81.10 BLADDER PROLAPSE, FEMALE, ACQUIRED: ICD-10-CM

## 2019-05-16 LAB
ALBUMIN SERPL BCP-MCNC: 3.6 G/DL (ref 3.5–5)
ALP SERPL-CCNC: 72 U/L (ref 46–116)
ALT SERPL W P-5'-P-CCNC: 21 U/L (ref 12–78)
ANION GAP SERPL CALCULATED.3IONS-SCNC: 11 MMOL/L (ref 4–13)
AST SERPL W P-5'-P-CCNC: 18 U/L (ref 5–45)
BACTERIA UR QL AUTO: ABNORMAL /HPF
BASOPHILS # BLD AUTO: 0.05 THOUSANDS/ΜL (ref 0–0.1)
BASOPHILS NFR BLD AUTO: 1 % (ref 0–1)
BILIRUB SERPL-MCNC: 0.4 MG/DL (ref 0.2–1)
BILIRUB UR QL STRIP: NEGATIVE
BUN SERPL-MCNC: 51 MG/DL (ref 5–25)
CALCIUM SERPL-MCNC: 8.9 MG/DL (ref 8.3–10.1)
CHLORIDE SERPL-SCNC: 103 MMOL/L (ref 100–108)
CLARITY UR: CLEAR
CO2 SERPL-SCNC: 25 MMOL/L (ref 21–32)
COLOR UR: YELLOW
CREAT SERPL-MCNC: 2.63 MG/DL (ref 0.6–1.3)
EOSINOPHIL # BLD AUTO: 0.1 THOUSAND/ΜL (ref 0–0.61)
EOSINOPHIL NFR BLD AUTO: 1 % (ref 0–6)
ERYTHROCYTE [DISTWIDTH] IN BLOOD BY AUTOMATED COUNT: 14.3 % (ref 11.6–15.1)
GFR SERPL CREATININE-BSD FRML MDRD: 16 ML/MIN/1.73SQ M
GLUCOSE SERPL-MCNC: 91 MG/DL (ref 65–140)
GLUCOSE UR STRIP-MCNC: NEGATIVE MG/DL
HCT VFR BLD AUTO: 40.7 % (ref 34.8–46.1)
HGB BLD-MCNC: 13.2 G/DL (ref 11.5–15.4)
HGB UR QL STRIP.AUTO: ABNORMAL
IMM GRANULOCYTES # BLD AUTO: 0.03 THOUSAND/UL (ref 0–0.2)
IMM GRANULOCYTES NFR BLD AUTO: 0 % (ref 0–2)
KETONES UR STRIP-MCNC: NEGATIVE MG/DL
LEUKOCYTE ESTERASE UR QL STRIP: ABNORMAL
LYMPHOCYTES # BLD AUTO: 1.12 THOUSANDS/ΜL (ref 0.6–4.47)
LYMPHOCYTES NFR BLD AUTO: 13 % (ref 14–44)
MCH RBC QN AUTO: 30.9 PG (ref 26.8–34.3)
MCHC RBC AUTO-ENTMCNC: 32.4 G/DL (ref 31.4–37.4)
MCV RBC AUTO: 95 FL (ref 82–98)
MONOCYTES # BLD AUTO: 0.79 THOUSAND/ΜL (ref 0.17–1.22)
MONOCYTES NFR BLD AUTO: 9 % (ref 4–12)
NEUTROPHILS # BLD AUTO: 6.56 THOUSANDS/ΜL (ref 1.85–7.62)
NEUTS SEG NFR BLD AUTO: 76 % (ref 43–75)
NITRITE UR QL STRIP: POSITIVE
NON-SQ EPI CELLS URNS QL MICRO: ABNORMAL /HPF
NRBC BLD AUTO-RTO: 0 /100 WBCS
OTHER STN SPEC: ABNORMAL
PH UR STRIP.AUTO: 6.5 [PH]
PLATELET # BLD AUTO: 302 THOUSANDS/UL (ref 149–390)
PMV BLD AUTO: 9.8 FL (ref 8.9–12.7)
POTASSIUM SERPL-SCNC: 4.2 MMOL/L (ref 3.5–5.3)
PROT SERPL-MCNC: 7.7 G/DL (ref 6.4–8.2)
PROT UR STRIP-MCNC: ABNORMAL MG/DL
RBC # BLD AUTO: 4.27 MILLION/UL (ref 3.81–5.12)
RBC #/AREA URNS AUTO: ABNORMAL /HPF
SODIUM SERPL-SCNC: 139 MMOL/L (ref 136–145)
SP GR UR STRIP.AUTO: 1.02 (ref 1–1.03)
UROBILINOGEN UR QL STRIP.AUTO: 0.2 E.U./DL
WBC # BLD AUTO: 8.65 THOUSAND/UL (ref 4.31–10.16)
WBC #/AREA URNS AUTO: ABNORMAL /HPF

## 2019-05-16 PROCEDURE — 99284 EMERGENCY DEPT VISIT MOD MDM: CPT | Performed by: EMERGENCY MEDICINE

## 2019-05-16 PROCEDURE — 87040 BLOOD CULTURE FOR BACTERIA: CPT | Performed by: EMERGENCY MEDICINE

## 2019-05-16 PROCEDURE — 80053 COMPREHEN METABOLIC PANEL: CPT | Performed by: EMERGENCY MEDICINE

## 2019-05-16 PROCEDURE — 81001 URINALYSIS AUTO W/SCOPE: CPT | Performed by: EMERGENCY MEDICINE

## 2019-05-16 PROCEDURE — 1124F ACP DISCUSS-NO DSCNMKR DOCD: CPT | Performed by: HOSPITALIST

## 2019-05-16 PROCEDURE — 74176 CT ABD & PELVIS W/O CONTRAST: CPT

## 2019-05-16 PROCEDURE — 99285 EMERGENCY DEPT VISIT HI MDM: CPT

## 2019-05-16 PROCEDURE — 87086 URINE CULTURE/COLONY COUNT: CPT | Performed by: EMERGENCY MEDICINE

## 2019-05-16 PROCEDURE — 99220 PR INITIAL OBSERVATION CARE/DAY 70 MINUTES: CPT | Performed by: PHYSICIAN ASSISTANT

## 2019-05-16 PROCEDURE — 36415 COLL VENOUS BLD VENIPUNCTURE: CPT | Performed by: EMERGENCY MEDICINE

## 2019-05-16 PROCEDURE — 85025 COMPLETE CBC W/AUTO DIFF WBC: CPT | Performed by: EMERGENCY MEDICINE

## 2019-05-16 PROCEDURE — 96365 THER/PROPH/DIAG IV INF INIT: CPT

## 2019-05-16 RX ORDER — HALOPERIDOL 1 MG/1
0.5 TABLET ORAL EVERY 6 HOURS PRN
Status: DISCONTINUED | OUTPATIENT
Start: 2019-05-16 | End: 2019-05-18 | Stop reason: HOSPADM

## 2019-05-16 RX ORDER — PROCHLORPERAZINE MALEATE 10 MG
10 TABLET ORAL EVERY 6 HOURS PRN
Status: DISCONTINUED | OUTPATIENT
Start: 2019-05-16 | End: 2019-05-18 | Stop reason: HOSPADM

## 2019-05-16 RX ORDER — LORAZEPAM 0.5 MG/1
0.5 TABLET ORAL EVERY 6 HOURS PRN
Status: DISCONTINUED | OUTPATIENT
Start: 2019-05-16 | End: 2019-05-18 | Stop reason: HOSPADM

## 2019-05-16 RX ORDER — CEPHALEXIN 250 MG/1
500 CAPSULE ORAL ONCE
Status: COMPLETED | OUTPATIENT
Start: 2019-05-16 | End: 2019-05-16

## 2019-05-16 RX ORDER — CEPHALEXIN 250 MG/1
250 CAPSULE ORAL EVERY 24 HOURS
Status: DISCONTINUED | OUTPATIENT
Start: 2019-05-17 | End: 2019-05-18 | Stop reason: HOSPADM

## 2019-05-16 RX ORDER — CEPHALEXIN 500 MG/1
500 CAPSULE ORAL EVERY 12 HOURS SCHEDULED
Qty: 14 CAPSULE | Refills: 0 | Status: SHIPPED | OUTPATIENT
Start: 2019-05-16 | End: 2019-05-18 | Stop reason: HOSPADM

## 2019-05-16 RX ORDER — ACETAMINOPHEN 325 MG/1
500 TABLET ORAL EVERY 6 HOURS PRN
Status: DISCONTINUED | OUTPATIENT
Start: 2019-05-16 | End: 2019-05-18 | Stop reason: HOSPADM

## 2019-05-16 RX ORDER — BISACODYL 10 MG
10 SUPPOSITORY, RECTAL RECTAL DAILY
Status: DISCONTINUED | OUTPATIENT
Start: 2019-05-17 | End: 2019-05-18 | Stop reason: HOSPADM

## 2019-05-16 RX ORDER — PROCHLORPERAZINE MALEATE 10 MG
10 TABLET ORAL EVERY 6 HOURS PRN
COMMUNITY

## 2019-05-16 RX ORDER — ASPIRIN 81 MG/1
81 TABLET ORAL DAILY
Status: DISCONTINUED | OUTPATIENT
Start: 2019-05-17 | End: 2019-05-18 | Stop reason: HOSPADM

## 2019-05-16 RX ORDER — CIPROFLOXACIN 500 MG/1
500 TABLET, FILM COATED ORAL EVERY 12 HOURS SCHEDULED
COMMUNITY
End: 2019-05-18 | Stop reason: HOSPADM

## 2019-05-16 RX ORDER — LISINOPRIL 2.5 MG/1
2.5 TABLET ORAL DAILY
Status: DISCONTINUED | OUTPATIENT
Start: 2019-05-17 | End: 2019-05-18 | Stop reason: HOSPADM

## 2019-05-16 RX ORDER — HALOPERIDOL 0.5 MG/1
0.5 TABLET ORAL EVERY 6 HOURS PRN
COMMUNITY

## 2019-05-16 RX ADMIN — METOPROLOL TARTRATE 25 MG: 25 TABLET ORAL at 22:28

## 2019-05-16 RX ADMIN — CEFTRIAXONE SODIUM 1000 MG: 10 INJECTION, POWDER, FOR SOLUTION INTRAVENOUS at 17:54

## 2019-05-16 RX ADMIN — CEPHALEXIN 500 MG: 250 CAPSULE ORAL at 18:45

## 2019-05-16 RX ADMIN — SODIUM CHLORIDE 500 ML: 0.9 INJECTION, SOLUTION INTRAVENOUS at 17:36

## 2019-05-17 LAB — BACTERIA UR CULT: NORMAL

## 2019-05-17 PROCEDURE — 99225 PR SBSQ OBSERVATION CARE/DAY 25 MINUTES: CPT | Performed by: OBSTETRICS & GYNECOLOGY

## 2019-05-17 PROCEDURE — 99225 PR SBSQ OBSERVATION CARE/DAY 25 MINUTES: CPT | Performed by: HOSPITALIST

## 2019-05-17 RX ADMIN — CEPHALEXIN 250 MG: 250 CAPSULE ORAL at 17:06

## 2019-05-17 RX ADMIN — ACETAMINOPHEN 488 MG: 325 TABLET, FILM COATED ORAL at 11:41

## 2019-05-17 RX ADMIN — METOPROLOL TARTRATE 25 MG: 25 TABLET ORAL at 20:21

## 2019-05-17 RX ADMIN — ASPIRIN 81 MG: 81 TABLET, COATED ORAL at 08:04

## 2019-05-17 RX ADMIN — METOPROLOL TARTRATE 25 MG: 25 TABLET ORAL at 08:05

## 2019-05-17 RX ADMIN — LISINOPRIL 2.5 MG: 2.5 TABLET ORAL at 08:05

## 2019-05-17 RX ADMIN — BISACODYL 10 MG: 10 SUPPOSITORY RECTAL at 08:04

## 2019-05-18 VITALS
HEART RATE: 62 BPM | DIASTOLIC BLOOD PRESSURE: 70 MMHG | RESPIRATION RATE: 18 BRPM | TEMPERATURE: 98.3 F | WEIGHT: 137.79 LBS | SYSTOLIC BLOOD PRESSURE: 165 MMHG | BODY MASS INDEX: 22.93 KG/M2 | OXYGEN SATURATION: 98 %

## 2019-05-18 PROCEDURE — 99217 PR OBSERVATION CARE DISCHARGE MANAGEMENT: CPT | Performed by: HOSPITALIST

## 2019-05-18 RX ORDER — CEPHALEXIN 250 MG/1
250 CAPSULE ORAL EVERY 24 HOURS
Qty: 2 CAPSULE | Refills: 0 | Status: SHIPPED | OUTPATIENT
Start: 2019-05-19 | End: 2019-05-21

## 2019-05-18 RX ADMIN — LISINOPRIL 2.5 MG: 2.5 TABLET ORAL at 08:46

## 2019-05-18 RX ADMIN — CEPHALEXIN 250 MG: 250 CAPSULE ORAL at 16:11

## 2019-05-18 RX ADMIN — METOPROLOL TARTRATE 25 MG: 25 TABLET ORAL at 08:46

## 2019-05-18 RX ADMIN — ASPIRIN 81 MG: 81 TABLET, COATED ORAL at 08:46

## 2019-05-18 RX ADMIN — ACETAMINOPHEN 488 MG: 325 TABLET, FILM COATED ORAL at 09:54

## 2019-05-21 ENCOUNTER — TELEPHONE (OUTPATIENT)
Dept: OBGYN CLINIC | Facility: CLINIC | Age: 84
End: 2019-05-21

## 2019-05-21 LAB
BACTERIA BLD CULT: NORMAL
BACTERIA BLD CULT: NORMAL

## 2019-05-22 ENCOUNTER — TELEPHONE (OUTPATIENT)
Dept: OBGYN CLINIC | Facility: CLINIC | Age: 84
End: 2019-05-22

## 2019-05-29 ENCOUNTER — TELEPHONE (OUTPATIENT)
Dept: OBGYN CLINIC | Facility: CLINIC | Age: 84
End: 2019-05-29

## 2019-05-31 ENCOUNTER — TELEPHONE (OUTPATIENT)
Dept: OBGYN CLINIC | Facility: CLINIC | Age: 84
End: 2019-05-31

## 2019-08-14 ENCOUNTER — APPOINTMENT (EMERGENCY)
Dept: RADIOLOGY | Facility: HOSPITAL | Age: 84
End: 2019-08-14
Payer: MEDICARE

## 2019-08-14 ENCOUNTER — HOSPITAL ENCOUNTER (EMERGENCY)
Facility: HOSPITAL | Age: 84
Discharge: HOME | End: 2019-08-14
Attending: EMERGENCY MEDICINE
Payer: MEDICARE

## 2019-08-14 VITALS
DIASTOLIC BLOOD PRESSURE: 80 MMHG | RESPIRATION RATE: 20 BRPM | TEMPERATURE: 97.7 F | OXYGEN SATURATION: 97 % | HEART RATE: 68 BPM | SYSTOLIC BLOOD PRESSURE: 152 MMHG

## 2019-08-14 DIAGNOSIS — R58 BLEEDING: Primary | ICD-10-CM

## 2019-08-14 LAB
ABO GROUP BLD: NORMAL
ALBUMIN SERPL BCP-MCNC: 3.8 G/DL (ref 3.5–5)
ALP SERPL-CCNC: 76 U/L (ref 46–116)
ALT SERPL W P-5'-P-CCNC: 20 U/L (ref 12–78)
ANION GAP SERPL CALCULATED.3IONS-SCNC: 9 MMOL/L (ref 4–13)
APTT PPP: 29 SECONDS (ref 23–37)
AST SERPL W P-5'-P-CCNC: 14 U/L (ref 5–45)
ATRIAL RATE: 357 BPM
BASOPHILS # BLD AUTO: 0.1 THOUSANDS/ΜL (ref 0–0.1)
BASOPHILS NFR BLD AUTO: 1 % (ref 0–1)
BILIRUB SERPL-MCNC: 0.38 MG/DL (ref 0.2–1)
BLD GP AB SCN SERPL QL: NEGATIVE
BUN SERPL-MCNC: 50 MG/DL (ref 5–25)
CALCIUM SERPL-MCNC: 8.7 MG/DL (ref 8.3–10.1)
CHLORIDE SERPL-SCNC: 113 MMOL/L (ref 100–108)
CO2 SERPL-SCNC: 26 MMOL/L (ref 21–32)
CREAT SERPL-MCNC: 2.2 MG/DL (ref 0.6–1.3)
EOSINOPHIL # BLD AUTO: 0.38 THOUSAND/ΜL (ref 0–0.61)
EOSINOPHIL NFR BLD AUTO: 4 % (ref 0–6)
ERYTHROCYTE [DISTWIDTH] IN BLOOD BY AUTOMATED COUNT: 13.3 % (ref 11.6–15.1)
GFR SERPL CREATININE-BSD FRML MDRD: 19 ML/MIN/1.73SQ M
GLUCOSE SERPL-MCNC: 135 MG/DL (ref 65–140)
HCT VFR BLD AUTO: 38.9 % (ref 34.8–46.1)
HGB BLD-MCNC: 12.3 G/DL (ref 11.5–15.4)
IMM GRANULOCYTES # BLD AUTO: 0.05 THOUSAND/UL (ref 0–0.2)
IMM GRANULOCYTES NFR BLD AUTO: 1 % (ref 0–2)
INR PPP: 1.07 (ref 0.84–1.19)
LYMPHOCYTES # BLD AUTO: 1.4 THOUSANDS/ΜL (ref 0.6–4.47)
LYMPHOCYTES NFR BLD AUTO: 14 % (ref 14–44)
MCH RBC QN AUTO: 32.1 PG (ref 26.8–34.3)
MCHC RBC AUTO-ENTMCNC: 31.6 G/DL (ref 31.4–37.4)
MCV RBC AUTO: 102 FL (ref 82–98)
MONOCYTES # BLD AUTO: 0.68 THOUSAND/ΜL (ref 0.17–1.22)
MONOCYTES NFR BLD AUTO: 7 % (ref 4–12)
NEUTROPHILS # BLD AUTO: 7.59 THOUSANDS/ΜL (ref 1.85–7.62)
NEUTS SEG NFR BLD AUTO: 73 % (ref 43–75)
NRBC BLD AUTO-RTO: 0 /100 WBCS
PLATELET # BLD AUTO: 287 THOUSANDS/UL (ref 149–390)
PMV BLD AUTO: 10.3 FL (ref 8.9–12.7)
POTASSIUM SERPL-SCNC: 5.2 MMOL/L (ref 3.5–5.3)
PROT SERPL-MCNC: 7.7 G/DL (ref 6.4–8.2)
PROTHROMBIN TIME: 13.5 SECONDS (ref 11.6–14.5)
QRS AXIS: 16 DEGREES
QRSD INTERVAL: 80 MS
QT INTERVAL: 382 MS
QTC INTERVAL: 426 MS
RBC # BLD AUTO: 3.83 MILLION/UL (ref 3.81–5.12)
RH BLD: POSITIVE
SODIUM SERPL-SCNC: 148 MMOL/L (ref 136–145)
SPECIMEN EXPIRATION DATE: NORMAL
T WAVE AXIS: 55 DEGREES
VENTRICULAR RATE: 75 BPM
WBC # BLD AUTO: 10.2 THOUSAND/UL (ref 4.31–10.16)

## 2019-08-14 PROCEDURE — 99284 EMERGENCY DEPT VISIT MOD MDM: CPT

## 2019-08-14 PROCEDURE — 36415 COLL VENOUS BLD VENIPUNCTURE: CPT | Performed by: EMERGENCY MEDICINE

## 2019-08-14 PROCEDURE — 85610 PROTHROMBIN TIME: CPT | Performed by: EMERGENCY MEDICINE

## 2019-08-14 PROCEDURE — 85730 THROMBOPLASTIN TIME PARTIAL: CPT | Performed by: EMERGENCY MEDICINE

## 2019-08-14 PROCEDURE — 93010 ELECTROCARDIOGRAM REPORT: CPT | Performed by: INTERNAL MEDICINE

## 2019-08-14 PROCEDURE — 70450 CT HEAD/BRAIN W/O DYE: CPT

## 2019-08-14 PROCEDURE — 72125 CT NECK SPINE W/O DYE: CPT

## 2019-08-14 PROCEDURE — 86901 BLOOD TYPING SEROLOGIC RH(D): CPT | Performed by: EMERGENCY MEDICINE

## 2019-08-14 PROCEDURE — 93005 ELECTROCARDIOGRAM TRACING: CPT

## 2019-08-14 PROCEDURE — 86900 BLOOD TYPING SEROLOGIC ABO: CPT | Performed by: EMERGENCY MEDICINE

## 2019-08-14 PROCEDURE — 80053 COMPREHEN METABOLIC PANEL: CPT | Performed by: EMERGENCY MEDICINE

## 2019-08-14 PROCEDURE — 85025 COMPLETE CBC W/AUTO DIFF WBC: CPT | Performed by: EMERGENCY MEDICINE

## 2019-08-14 PROCEDURE — 86850 RBC ANTIBODY SCREEN: CPT | Performed by: EMERGENCY MEDICINE

## 2019-08-14 PROCEDURE — 96365 THER/PROPH/DIAG IV INF INIT: CPT

## 2019-08-14 PROCEDURE — 99284 EMERGENCY DEPT VISIT MOD MDM: CPT | Performed by: EMERGENCY MEDICINE

## 2019-08-14 PROCEDURE — 82272 OCCULT BLD FECES 1-3 TESTS: CPT

## 2019-08-14 RX ORDER — SODIUM CHLORIDE, SODIUM GLUCONATE, SODIUM ACETATE, POTASSIUM CHLORIDE, MAGNESIUM CHLORIDE, SODIUM PHOSPHATE, DIBASIC, AND POTASSIUM PHOSPHATE .53; .5; .37; .037; .03; .012; .00082 G/100ML; G/100ML; G/100ML; G/100ML; G/100ML; G/100ML; G/100ML
1000 INJECTION, SOLUTION INTRAVENOUS ONCE
Status: COMPLETED | OUTPATIENT
Start: 2019-08-14 | End: 2019-08-14

## 2019-08-14 RX ADMIN — SODIUM CHLORIDE, SODIUM GLUCONATE, SODIUM ACETATE, POTASSIUM CHLORIDE, MAGNESIUM CHLORIDE, SODIUM PHOSPHATE, DIBASIC, AND POTASSIUM PHOSPHATE 1000 ML: .53; .5; .37; .037; .03; .012; .00082 INJECTION, SOLUTION INTRAVENOUS at 12:41

## 2019-08-14 NOTE — ED ATTENDING ATTESTATION
Ashlyn Pryor MD, saw and evaluated the patient  I have discussed the patient with the resident/non-physician practitioner and agree with the resident's/non-physician practitioner's findings, Plan of Care, and MDM as documented in the resident's/non-physician practitioner's note, except where noted  All available labs and Radiology studies were reviewed  I was present for key portions of any procedure(s) performed by the resident/non-physician practitioner and I was immediately available to provide assistance  At this point I agree with the current assessment done in the Emergency Department  I have conducted an independent evaluation of this patient a history and physical is as follows:      Critical Care Time  Procedures     81 yo female with hx of dementia, uterine prolapse, htn, afib sent in from nursing home after being found covered in blood  Pt unable to states where blood came from  No active bleeding currently  Nursing home staff unsure of trauma or site of bleeding  Pt on asa  Vss, afebrile, lungs cta, rrr, abdomen soft nontender, right nare dried blood, face with dried blood, feet with dried blood, heme negative  Likely nose bleed  Labs, ct head, cspine, type and screen  Will discuss with family regarding goals of care and if egd warranted

## 2019-08-14 NOTE — SOCIAL WORK
CM consulted to arrange transportation for Pt to return back to [facility]  Pt currently sitting up in bed  CM contacted [facility] and spoke to Stef who also reported Pt could be transported [facility] [address] [facility]  CM met with Pt and son, Yeni Chew, to discuss  Yeni Chew agreeable to [address]  [facility] and understands OOP cost      CM contacted [facility] who is able to transport Pt at 1530  Pt, family, RN, and Stef aware

## 2019-08-14 NOTE — ED PROVIDER NOTES
History  Chief Complaint   Patient presents with    Bleeding/Bruising     pt found at long term care residence covered in blood  unk amount  unk origin, pt dx with dementia and unable to determine origin     Patient is a 80year-old female with a history of dementia, daily aspirin use who presents from [facility] who presents for suspected hematemesis  "covered in blood"   Per EMS, patient was found to be covered and both blood and feces in her bed this morning  Nursing home workers were unable to determine the source of the bleeding, but it was rectal given that it was mixed with stool  Patient has dementia and is unable to provide further history  Son is at bedside and reports the patient has a history of rectal bleeding  Prior to Admission Medications   Prescriptions Last Dose Informant Patient Reported? Taking?    LORazepam (ATIVAN) 0 5 mg tablet   No No   Sig: Take 1 tablet (0 5 mg total) by mouth once for 1 dose 1/2 hour before urology appointment   Patient taking differently: Take 0 5 mg by mouth every 6 (six) hours as needed    Sodium Phosphates (ENEMA RE)   Yes No   Sig: Insert 1 application into the rectum as needed for constipation If no BM in 5 days   acetaminophen (TYLENOL) 500 mg tablet   Yes No   Sig: Take 500 mg by mouth every 6 (six) hours as needed for mild pain   aspirin (ECOTRIN LOW STRENGTH) 81 mg EC tablet   Yes No   Sig: Take 81 mg by mouth daily   bisacodyl (BISACODYL LAXATIVE) 10 mg suppository   Yes No   Sig: Insert 10 mg into the rectum daily   haloperidol (HALDOL) 0 5 mg tablet   Yes No   Sig: Take 0 5 mg by mouth every 6 (six) hours as needed for agitation   hyoscyamine (LEVSIN/SL) 0 125 mg SL tablet   Yes No   Sig: Take 0 125 mg by mouth every 6 (six) hours as needed for cramping   lisinopril (ZESTRIL) 5 mg tablet   No No   Sig: Take 0 5 tablets (2 5 mg total) by mouth daily   loperamide (IMODIUM) 2 mg capsule   Yes No   Sig: Take 2 mg by mouth as needed for diarrhea Take 1 dose after each bowel movement *max dose 16mg   magnesium hydroxide (MILK OF MAGNESIA) 400 mg/5 mL oral suspension   Yes No   Sig: Take by mouth daily as needed for constipation   metoprolol tartrate (LOPRESSOR) 25 mg tablet   Yes No   Sig: Take 25 mg by mouth every 12 (twelve) hours   prochlorperazine (COMPAZINE) 10 mg tablet   Yes No   Sig: Take 10 mg by mouth every 6 (six) hours as needed for nausea or vomiting      Facility-Administered Medications: None       Past Medical History:   Diagnosis Date    A-fib ([facility] )     CKD (chronic kidney disease)     COPD (chronic obstructive pulmonary disease) ([facility] )     Dementia     Hypertension     Urinary tract infection     Uterine prolapse        No past surgical history on file  Family History   Problem Relation Age of Onset    No Known Problems Father     No Known Problems Mother      I have reviewed and agree with the history as documented  Social History     Tobacco Use    Smoking status: Former Smoker    Smokeless tobacco: Never Used   Substance Use Topics    Alcohol use: No    Drug use: No        Review of Systems   Unable to perform ROS: Dementia       Physical Exam  ED Triage Vitals [08/14/19 1020]   Temperature Pulse Respirations Blood Pressure SpO2   97 7 °F (36 5 °C) 68 20 152/80 97 %      Temp Source Heart Rate Source Patient Position - Orthostatic VS BP Location FiO2 (%)   Oral Monitor -- Left arm --      Pain Score       --             Orthostatic Vital Signs  Vitals:    08/14/19 1020   BP: 152/80   Pulse: 68       Physical Exam   Constitutional: She is oriented to person, place, and time  She appears well-developed and well-nourished  No distress  HENT:   Head: Normocephalic and atraumatic  Dried blood noted in right> left nare, the oropharynx, lips, and on the bilateral feet  Eyes: Pupils are equal, round, and reactive to light  EOM are normal    Neck: Normal range of motion  Neck supple     Cardiovascular: Normal rate, regular rhythm and normal heart sounds  Pulmonary/Chest: Effort normal and breath sounds normal  No respiratory distress  Abdominal: Soft  Bowel sounds are normal  There is no tenderness  Genitourinary: Rectal exam shows guaiac negative stool  Musculoskeletal: Normal range of motion  Neurological: She is alert and oriented to person, place, and time  Skin: Skin is warm and dry  Psychiatric: She has a normal mood and affect  Nursing note and vitals reviewed        ED Medications  Medications   multi-electrolyte (ISOLYTE-S PH 7 4) bolus 1,000 mL (0 mL Intravenous Stopped 8/14/19 1341)       Diagnostic Studies  Results Reviewed     Procedure Component Value Units Date/Time    Comprehensive metabolic panel [[id number]]  (Abnormal) Collected:  08/14/19 1122    Lab Status:  Final result Specimen:  Blood from Arm, Left Updated:  08/14/19 1152     Sodium 148 mmol/L      Potassium 5 2 mmol/L      Chloride 113 mmol/L      CO2 26 mmol/L      ANION GAP 9 mmol/L      BUN 50 mg/dL      Creatinine 2 20 mg/dL      Glucose 135 mg/dL      Calcium 8 7 mg/dL      AST 14 U/L      ALT 20 U/L      Alkaline Phosphatase 76 U/L      Total Protein 7 7 g/dL      Albumin 3 8 g/dL      Total Bilirubin 0 38 mg/dL      eGFR 19 ml/min/1 73sq m     Narrative:       National Kidney Disease Foundation guidelines for Chronic Kidney Disease (CKD):     Stage 1 with normal or high GFR (GFR > 90 mL/min/1 73 square meters)    Stage 2 Mild CKD (GFR = 60-89 mL/min/1 73 square meters)    Stage 3A Moderate CKD (GFR = 45-59 mL/min/1 73 square meters)    Stage 3B Moderate CKD (GFR = 30-44 mL/min/1 73 square meters)    Stage 4 Severe CKD (GFR = 15-29 mL/min/1 73 square meters)    Stage 5 End Stage CKD (GFR <15 mL/min/1 73 square meters)  Note: GFR calculation is accurate only with a steady state creatinine    Protime-INR [[id number]]  (Normal) Collected:  08/14/19 1122    Lab Status:  Final result Specimen:  Blood from Arm, Left Updated: 08/14/19 1152     Protime 13 5 seconds      INR 1 07    APTT [[id number]]  (Normal) Collected:  08/14/19 1122    Lab Status:  Final result Specimen:  Blood from Arm, Left Updated:  08/14/19 1152     PTT 29 seconds     CBC and differential [[id number]]  (Abnormal) Collected:  08/14/19 1122    Lab Status:  Final result Specimen:  Blood from Arm, Left Updated:  08/14/19 1134     WBC 10 20 Thousand/uL      RBC 3 83 Million/uL      Hemoglobin 12 3 g/dL      Hematocrit 38 9 %       fL      MCH 32 1 pg      MCHC 31 6 g/dL      RDW 13 3 %      MPV 10 3 fL      Platelets 736 Thousands/uL      nRBC 0 /100 WBCs      Neutrophils Relative 73 %      Immat GRANS % 1 %      Lymphocytes Relative 14 %      Monocytes Relative 7 %      Eosinophils Relative 4 %      Basophils Relative 1 %      Neutrophils Absolute 7 59 Thousands/µL      Immature Grans Absolute 0 05 Thousand/uL      Lymphocytes Absolute 1 40 Thousands/µL      Monocytes Absolute 0 68 Thousand/µL      Eosinophils Absolute 0 38 Thousand/µL      Basophils Absolute 0 10 Thousands/µL                  CT head without contrast   Final Result by Thang Johnson MD (08/14 1245)      No acute intracranial abnormality  Microangiopathic changes  Workstation performed: [id number]         CT spine cervical without contrast   Final Result by Thang Johnson MD (08/14 1248)      No cervical spine fracture or traumatic malalignment  Workstation performed: [id number]               Procedures  Procedures        ED Course                               MDM  Number of Diagnoses or Management Options  Bleeding:   Diagnosis management comments: Patient is a 80year-old female with a history of dementia, daily aspirin use who presents from [facility] who presents for suspected hematemesis  Exam shows dried blood in right> left nare, the oropharynx, lips, and on the bilateral feet   Most concerning for epistaxis, the differential also includes upper GI bleed  Low concern for rectal bleeding given no  Visible blood   And negative guaiac  Lab benign, patient remained stable without further episodes of bleeding while in the emergency department, after discussion with family, decision made to discharge patient  Return precautions given  Disposition  Final diagnoses:   Bleeding     Time reflects when diagnosis was documented in both MDM as applicable and the Disposition within this note     Time User Action Codes Description Comment    8/14/2019  1:40 PM Steven Trejo Add [R58] Bleeding       ED Disposition     ED Disposition Condition Date/Time Comment    Discharge Stable Wed Aug 14, 2019  1:40 PM Nettie Sanchez discharge to home/self care  Follow-up Information     Follow up With Specialties Details Why Contact Info Additional [address], DO Family Medicine Call today  [address] [facility] [address]  Emergency Department Emergency Medicine  As needed, If symptoms worsen [address] [place] [address]  [phone]  ED, [address] [facility], [address] [place] [address], [address]          Discharge Medication List as of 8/14/2019  1:41 PM      CONTINUE these medications which have NOT CHANGED    Details   acetaminophen (TYLENOL) 500 mg tablet Take 500 mg by mouth every 6 (six) hours as needed for mild pain, Historical Med      aspirin (ECOTRIN LOW STRENGTH) 81 mg EC tablet Take 81 mg by mouth daily, Historical Med      bisacodyl (BISACODYL LAXATIVE) 10 mg suppository Insert 10 mg into the rectum daily, Historical Med      haloperidol (HALDOL) 0 5 mg tablet Take 0 5 mg by mouth every 6 (six) hours as needed for agitation, Historical Med      hyoscyamine (LEVSIN/SL) 0 125 mg SL tablet Take 0 125 mg by mouth every 6 (six) hours as needed for cramping, Historical Med      lisinopril (ZESTRIL) 5 mg tablet Take 0 5 tablets (2 5 mg total) by mouth daily, Starting Tue 3/5/2019, Normal      loperamide (IMODIUM) 2 mg capsule Take 2 mg by mouth as needed for diarrhea Take 1 dose after each bowel movement *max dose 16mg, Historical Med      LORazepam (ATIVAN) 0 5 mg tablet Take 1 tablet (0 5 mg total) by mouth once for 1 dose 1/2 hour before urology appointment, Starting Wed 11/21/2018, Normal      magnesium hydroxide (MILK OF MAGNESIA) 400 mg/5 mL oral suspension Take by mouth daily as needed for constipation, Historical Med      metoprolol tartrate (LOPRESSOR) 25 mg tablet Take 25 mg by mouth every 12 (twelve) hours, Historical Med      prochlorperazine (COMPAZINE) 10 mg tablet Take 10 mg by mouth every 6 (six) hours as needed for nausea or vomiting, Historical Med      Sodium Phosphates (ENEMA RE) Insert 1 application into the rectum as needed for constipation If no BM in 5 days, Historical Med           No discharge procedures on file  ED Provider  Attending physically available and evaluated Catina Lopez I managed the patient along with the ED Attending      Electronically Signed by         Sheng Camejo MD  08/17/19 5022

## 2019-08-24 ENCOUNTER — HOSPITAL ENCOUNTER (EMERGENCY)
Facility: HOSPITAL | Age: 84
Discharge: HOME | End: 2019-08-24
Attending: EMERGENCY MEDICINE | Admitting: EMERGENCY MEDICINE
Payer: MEDICARE

## 2019-08-24 VITALS
HEIGHT: 65 IN | HEART RATE: 56 BPM | BODY MASS INDEX: 22.96 KG/M2 | TEMPERATURE: 97.3 F | SYSTOLIC BLOOD PRESSURE: 181 MMHG | OXYGEN SATURATION: 96 % | DIASTOLIC BLOOD PRESSURE: 88 MMHG | RESPIRATION RATE: 16 BRPM | WEIGHT: 137.79 LBS

## 2019-08-24 DIAGNOSIS — R04.0 EPISTAXIS: Primary | ICD-10-CM

## 2019-08-24 PROCEDURE — 99284 EMERGENCY DEPT VISIT MOD MDM: CPT | Performed by: EMERGENCY MEDICINE

## 2019-08-24 PROCEDURE — 30903 CONTROL OF NOSEBLEED: CPT | Performed by: EMERGENCY MEDICINE

## 2019-08-24 PROCEDURE — 99283 EMERGENCY DEPT VISIT LOW MDM: CPT

## 2019-08-24 RX ORDER — OXYMETAZOLINE HYDROCHLORIDE 0.05 G/100ML
2 SPRAY NASAL ONCE
Status: COMPLETED | OUTPATIENT
Start: 2019-08-24 | End: 2019-08-24

## 2019-08-24 RX ORDER — LIDOCAINE HYDROCHLORIDE AND EPINEPHRINE 20; 5 MG/ML; UG/ML
1 INJECTION, SOLUTION EPIDURAL; INFILTRATION; INTRACAUDAL; PERINEURAL ONCE
Status: DISCONTINUED | OUTPATIENT
Start: 2019-08-24 | End: 2019-08-24

## 2019-08-24 RX ORDER — LIDOCAINE HYDROCHLORIDE AND EPINEPHRINE 10; 10 MG/ML; UG/ML
1 INJECTION, SOLUTION INFILTRATION; PERINEURAL ONCE
Status: DISCONTINUED | OUTPATIENT
Start: 2019-08-24 | End: 2019-08-24

## 2019-08-24 RX ORDER — LIDOCAINE HYDROCHLORIDE AND EPINEPHRINE 10; 10 MG/ML; UG/ML
1 INJECTION, SOLUTION INFILTRATION; PERINEURAL ONCE
Status: COMPLETED | OUTPATIENT
Start: 2019-08-24 | End: 2019-08-24

## 2019-08-24 RX ORDER — CEPHALEXIN 250 MG/1
250 CAPSULE ORAL EVERY 6 HOURS SCHEDULED
Qty: 20 CAPSULE | Refills: 0 | Status: SHIPPED | OUTPATIENT
Start: 2019-08-24 | End: 2019-08-29

## 2019-08-24 RX ORDER — CEPHALEXIN 250 MG/1
250 CAPSULE ORAL ONCE
Status: COMPLETED | OUTPATIENT
Start: 2019-08-24 | End: 2019-08-24

## 2019-08-24 RX ADMIN — OXYMETAZOLINE HYDROCHLORIDE 2 SPRAY: 0.05 SPRAY NASAL at 11:04

## 2019-08-24 RX ADMIN — LIDOCAINE HYDROCHLORIDE,EPINEPHRINE BITARTRATE 1 ML: 10; .01 INJECTION, SOLUTION INFILTRATION; PERINEURAL at 12:26

## 2019-08-24 RX ADMIN — SILVER NITRATE APPLICATORS 2 APPLICATOR: 25; 75 STICK TOPICAL at 11:48

## 2019-08-24 RX ADMIN — CEPHALEXIN 250 MG: 250 CAPSULE ORAL at 14:42

## 2019-08-28 PROBLEM — R04.0 RECURRENT EPISTAXIS: Status: ACTIVE | Noted: 2019-08-28

## 2019-09-09 NOTE — ED PROVIDER NOTES
History  Chief Complaint   Patient presents with    Nose Bleed     Pt from [facility] started with nosebleed this AM     Patient presents with complaints of several hours epistaxis  Patient states that she had a similar episode several days ago and was evaluated in the emergency department  At that time, bleeding stopped  Patient is not on any blood thinning medication and denies any trauma  No prior episode of same before episodes is several days ago  No additional complaints  History provided by:  Patient  Nose Bleed   Location:  R nare  Timing:  Constant  Progression:  Unchanged  Chronicity:  Recurrent  Context: not anticoagulants, not aspirin use, not bleeding disorder, not foreign body and not trauma    Relieved by:  Nothing  Worsened by:  Nothing  Ineffective treatments:  Applying pressure  Associated symptoms: no cough, no fever and no sinus pain        Prior to Admission Medications   Prescriptions Last Dose Informant Patient Reported? Taking?    LORazepam (ATIVAN) 0 5 mg tablet   No No   Sig: Take 1 tablet (0 5 mg total) by mouth once for 1 dose 1/2 hour before urology appointment   Patient taking differently: Take 0 5 mg by mouth every 6 (six) hours as needed    Sodium Phosphates (ENEMA RE)   Yes No   Sig: Insert 1 application into the rectum as needed for constipation If no BM in 5 days   acetaminophen (TYLENOL) 500 mg tablet   Yes No   Sig: Take 500 mg by mouth every 6 (six) hours as needed for mild pain   aspirin (ECOTRIN LOW STRENGTH) 81 mg EC tablet   Yes No   Sig: Take 81 mg by mouth daily   bisacodyl (BISACODYL LAXATIVE) 10 mg suppository   Yes No   Sig: Insert 10 mg into the rectum daily   haloperidol (HALDOL) 0 5 mg tablet   Yes No   Sig: Take 0 5 mg by mouth every 6 (six) hours as needed for agitation   hyoscyamine (LEVSIN/SL) 0 125 mg SL tablet   Yes No   Sig: Take 0 125 mg by mouth every 6 (six) hours as needed for cramping   lisinopril (ZESTRIL) 5 mg tablet   No No   Sig: Take 0 5 tablets (2 5 mg total) by mouth daily   loperamide (IMODIUM) 2 mg capsule   Yes No   Sig: Take 2 mg by mouth as needed for diarrhea Take 1 dose after each bowel movement *max dose 16mg   magnesium hydroxide (MILK OF MAGNESIA) 400 mg/5 mL oral suspension   Yes No   Sig: Take by mouth daily as needed for constipation   metoprolol tartrate (LOPRESSOR) 25 mg tablet   Yes No   Sig: Take 25 mg by mouth every 12 (twelve) hours   prochlorperazine (COMPAZINE) 10 mg tablet   Yes No   Sig: Take 10 mg by mouth every 6 (six) hours as needed for nausea or vomiting      Facility-Administered Medications: None       Past Medical History:   Diagnosis Date    A-fib ([facility] )     CKD (chronic kidney disease)     [facility]     Dementia     Hypertension     Urinary tract infection     Uterine prolapse        History reviewed  No pertinent surgical history  Family History   Problem Relation Age of Onset    No Known Problems Father     No Known Problems Mother      I have reviewed and agree with the history as documented  Social History     Tobacco Use    Smoking status: Former Smoker    Smokeless tobacco: Never Used   Substance Use Topics    Alcohol use: No    Drug use: No        Review of Systems   Constitutional: Negative for chills and fever  HENT: Positive for nosebleeds  Negative for sinus pain  Respiratory: Negative for cough, chest tightness and shortness of breath  Cardiovascular: Negative for chest pain and leg swelling  Gastrointestinal: Negative for abdominal pain and nausea  Genitourinary: Negative for dysuria and flank pain  Musculoskeletal: Negative for arthralgias and back pain  Neurological: Negative for weakness and numbness  Hematological: Does not bruise/bleed easily  Psychiatric/Behavioral: Negative for agitation and confusion  All other systems reviewed and are negative        Physical Exam  Physical Exam   Constitutional: She is oriented to person, place, and time  She appears well-developed and well-nourished  No distress  HENT:   Head: Normocephalic and atraumatic  Epistaxis from R nostril   Eyes: Pupils are equal, round, and reactive to light  EOM are normal    Neck: Normal range of motion  Neck supple  No tracheal deviation present  Cardiovascular: Normal rate and regular rhythm  Pulmonary/Chest: Effort normal and breath sounds normal    Abdominal: Soft  Bowel sounds are normal    Musculoskeletal: Normal range of motion  She exhibits no edema or tenderness  Neurological: She is alert and oriented to person, place, and time  Skin: Skin is warm and dry  Psychiatric: She has a normal mood and affect  Her behavior is normal    Nursing note and vitals reviewed        Vital Signs  ED Triage Vitals   Temperature Pulse Respirations Blood Pressure SpO2   08/24/19 1033 08/24/19 1033 08/24/19 1033 08/24/19 1033 08/24/19 1033   (!) 97 3 °F (36 3 °C) 86 16 (!) 184/97 97 %      Temp Source Heart Rate Source Patient Position - Orthostatic VS BP Location FiO2 (%)   08/24/19 1033 08/24/19 1250 08/24/19 1554 08/24/19 1554 --   Oral Monitor Lying Right arm       Pain Score       08/24/19 1033       No Pain           Vitals:    08/24/19 1033 08/24/19 1250 08/24/19 1554   BP: (!) 184/97 150/67 (!) 181/88   Pulse: 86 77 56   Patient Position - Orthostatic VS:   Lying         Visual Acuity      ED Medications  Medications   oxymetazoline (AFRIN) 0 05 % nasal spray 2 spray (2 sprays Each Nare Given 8/24/19 1104)   silver nitrate-potassium nitrate (ARZOL SILVER NITRATE) 42-84 % applicator 2 applicator (2 applicators Topical Given 8/24/19 1148)   lidocaine-epinephrine (XYLOCAINE/EPINEPHRINE) 1 %-1:100,000 injection 1 mL (1 mL Infiltration Given 8/24/19 1226)   cephalexin (KEFLEX) capsule 250 mg (250 mg Oral Given 8/24/19 1442)       Diagnostic Studies  Results Reviewed     None                 No orders to display              Procedures  Procedures       ED Course Identification of Seniors at Risk      Most Recent Value   (ISAR) Identification of Seniors at Risk   Before the illness or injury that brought you to the Emergency, did you need someone to help you on a regular basis? 1 Filed at: 08/24/2019 1043   In the last 24 hours, have you needed more help than usual?  1 Filed at: 08/24/2019 1043   Have you been hospitalized for one or more nights during the past 6 months? 1 Filed at: 08/24/2019 1043   In general, do you see well?  0 Filed at: 08/24/2019 1043   In general, do you have serious problems with your memory? 0 Filed at: 08/24/2019 1043   Do you take more than three different medications every day? 1 Filed at: 08/24/2019 1043   ISAR Score  4 Filed at: 08/24/2019 1043                      MDM  Number of Diagnoses or Management Options  Epistaxis: established and worsening  Diagnosis management comments: Initially attempted to apply Afrin and nasal clamp  There appeared to be some active bleeding from the septum and I attempted to apply silver nitrate  Unfortunately, patient started to have bleeding again after this  At this time, I elected to place a 10 cm Merocel packing  As I was placing packing, patient pushed my hand away and I was only able to advance that approximately 50% of the way  This successfully stopped bleeding and I ultimately trimmed the end of the Merocel in attempt to discharge patient with antibiotics  Will plan for ENT follow-up      Risk of Complications, Morbidity, and/or Mortality  Presenting problems: moderate  Diagnostic procedures: moderate  Management options: moderate    Patient Progress  Patient progress: stable      Disposition  Final diagnoses:   Epistaxis     Time reflects when diagnosis was documented in both MDM as applicable and the Disposition within this note     Time User Action Codes Description Comment    8/24/2019  2:22 PM Qian Lantigua Add [R04 0] Epistaxis       ED Disposition     ED Disposition Condition Date/Time Comment    Discharge Stable Sat Aug 24, 2019  2:22 PM Ivan Celestin discharge to home/self care  Follow-up Information     Follow up With Specialties Details Why Indiana Mann MD Otolaryngology Schedule an appointment as soon as possible for a visit in 2 days For packing removal, Epistaxis evaluation [phone]    2[address] [facility] 107            Discharge Medication List as of 8/24/2019  2:29 PM      START taking these medications    Details   cephalexin (KEFLEX) 250 mg capsule Take 1 capsule (250 mg total) by mouth every 6 (six) hours for 5 days, Starting Sat 8/24/2019, Until u 8/29/2019, Print         CONTINUE these medications which have NOT CHANGED    Details   acetaminophen (TYLENOL) 500 mg tablet Take 500 mg by mouth every 6 (six) hours as needed for mild pain, Historical Med      aspirin (ECOTRIN LOW STRENGTH) 81 mg EC tablet Take 81 mg by mouth daily, Historical Med      bisacodyl (BISACODYL LAXATIVE) 10 mg suppository Insert 10 mg into the rectum daily, Historical Med      haloperidol (HALDOL) 0 5 mg tablet Take 0 5 mg by mouth every 6 (six) hours as needed for agitation, Historical Med      hyoscyamine (LEVSIN/SL) 0 125 mg SL tablet Take 0 125 mg by mouth every 6 (six) hours as needed for cramping, Historical Med      lisinopril (ZESTRIL) 5 mg tablet Take 0 5 tablets (2 5 mg total) by mouth daily, Starting Tue 3/5/2019, Normal      loperamide (IMODIUM) 2 mg capsule Take 2 mg by mouth as needed for diarrhea Take 1 dose after each bowel movement *max dose 16mg, Historical Med      LORazepam (ATIVAN) 0 5 mg tablet Take 1 tablet (0 5 mg total) by mouth once for 1 dose 1/2 hour before urology appointment, Starting Wed 11/21/2018, Normal      magnesium hydroxide (MILK OF MAGNESIA) 400 mg/5 mL oral suspension Take by mouth daily as needed for constipation, Historical Med      metoprolol tartrate (LOPRESSOR) 25 mg tablet Take 25 mg by mouth every 12 (twelve) hours, Historical Med      prochlorperazine (COMPAZINE) 10 mg tablet Take 10 mg by mouth every 6 (six) hours as needed for nausea or vomiting, Historical Med      Sodium Phosphates (ENEMA RE) Insert 1 application into the rectum as needed for constipation If no BM in 5 days, Historical Med           No discharge procedures on file      ED Provider  Electronically Signed by           Lili Griffin MD  09/09/19 1415

## 2019-09-25 ENCOUNTER — APPOINTMENT (EMERGENCY)
Dept: RADIOLOGY | Facility: HOSPITAL | Age: 84
End: 2019-09-25
Payer: MEDICARE

## 2019-09-25 ENCOUNTER — HOSPITAL ENCOUNTER (OUTPATIENT)
Facility: HOSPITAL | Age: 84
Setting detail: OBSERVATION
Discharge: HOME | End: 2019-09-28
Attending: EMERGENCY MEDICINE | Admitting: FAMILY MEDICINE
Payer: MEDICARE
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
## 2019-09-25 DIAGNOSIS — R79.89 ELEVATED BRAIN NATRIURETIC PEPTIDE (BNP) LEVEL: Primary | ICD-10-CM

## 2019-09-25 PROBLEM — N18.4 CKD (CHRONIC KIDNEY DISEASE) STAGE 4, GFR 15-29 ML/MIN (HCC): Status: ACTIVE | Noted: 2019-09-25

## 2019-09-25 LAB
ANION GAP SERPL CALCULATED.3IONS-SCNC: 6 MMOL/L (ref 4–13)
ATRIAL RATE: 77 BPM
BASOPHILS # BLD AUTO: 0.08 THOUSANDS/ΜL (ref 0–0.1)
BASOPHILS NFR BLD AUTO: 1 % (ref 0–1)
BUN SERPL-MCNC: 40 MG/DL (ref 5–25)
CALCIUM SERPL-MCNC: 8.9 MG/DL (ref 8.3–10.1)
CHLORIDE SERPL-SCNC: 109 MMOL/L (ref 100–108)
CK SERPL-CCNC: 97 U/L (ref 26–192)
CO2 SERPL-SCNC: 22 MMOL/L (ref 21–32)
CREAT SERPL-MCNC: 2.26 MG/DL (ref 0.6–1.3)
EOSINOPHIL # BLD AUTO: 0.48 THOUSAND/ΜL (ref 0–0.61)
EOSINOPHIL NFR BLD AUTO: 6 % (ref 0–6)
ERYTHROCYTE [DISTWIDTH] IN BLOOD BY AUTOMATED COUNT: 13 % (ref 11.6–15.1)
GFR SERPL CREATININE-BSD FRML MDRD: 19 ML/MIN/1.73SQ M
GLUCOSE SERPL-MCNC: 123 MG/DL (ref 65–140)
HCT VFR BLD AUTO: 40.9 % (ref 34.8–46.1)
HGB BLD-MCNC: 12.7 G/DL (ref 11.5–15.4)
IMM GRANULOCYTES # BLD AUTO: 0.02 THOUSAND/UL (ref 0–0.2)
IMM GRANULOCYTES NFR BLD AUTO: 0 % (ref 0–2)
LYMPHOCYTES # BLD AUTO: 1.46 THOUSANDS/ΜL (ref 0.6–4.47)
LYMPHOCYTES NFR BLD AUTO: 18 % (ref 14–44)
MCH RBC QN AUTO: 31 PG (ref 26.8–34.3)
MCHC RBC AUTO-ENTMCNC: 31.1 G/DL (ref 31.4–37.4)
MCV RBC AUTO: 100 FL (ref 82–98)
MONOCYTES # BLD AUTO: 0.83 THOUSAND/ΜL (ref 0.17–1.22)
MONOCYTES NFR BLD AUTO: 10 % (ref 4–12)
NEUTROPHILS # BLD AUTO: 5.3 THOUSANDS/ΜL (ref 1.85–7.62)
NEUTS SEG NFR BLD AUTO: 65 % (ref 43–75)
NRBC BLD AUTO-RTO: 0 /100 WBCS
NT-PROBNP SERPL-MCNC: 4003 PG/ML
PLATELET # BLD AUTO: 358 THOUSANDS/UL (ref 149–390)
PMV BLD AUTO: 10.1 FL (ref 8.9–12.7)
POTASSIUM SERPL-SCNC: 4.6 MMOL/L (ref 3.5–5.3)
QRS AXIS: -3 DEGREES
QRSD INTERVAL: 82 MS
QT INTERVAL: 414 MS
QTC INTERVAL: 430 MS
RBC # BLD AUTO: 4.1 MILLION/UL (ref 3.81–5.12)
SODIUM SERPL-SCNC: 137 MMOL/L (ref 136–145)
T WAVE AXIS: 38 DEGREES
TROPONIN I SERPL-MCNC: <0.02 NG/ML
VENTRICULAR RATE: 65 BPM
WBC # BLD AUTO: 8.17 THOUSAND/UL (ref 4.31–10.16)

## 2019-09-25 PROCEDURE — 36415 COLL VENOUS BLD VENIPUNCTURE: CPT | Performed by: EMERGENCY MEDICINE

## 2019-09-25 PROCEDURE — 84484 ASSAY OF TROPONIN QUANT: CPT | Performed by: FAMILY MEDICINE

## 2019-09-25 PROCEDURE — 84484 ASSAY OF TROPONIN QUANT: CPT | Performed by: EMERGENCY MEDICINE

## 2019-09-25 PROCEDURE — 72125 CT NECK SPINE W/O DYE: CPT

## 2019-09-25 PROCEDURE — 93005 ELECTROCARDIOGRAM TRACING: CPT

## 2019-09-25 PROCEDURE — 80048 BASIC METABOLIC PNL TOTAL CA: CPT | Performed by: EMERGENCY MEDICINE

## 2019-09-25 PROCEDURE — 71250 CT THORAX DX C-: CPT

## 2019-09-25 PROCEDURE — 74176 CT ABD & PELVIS W/O CONTRAST: CPT

## 2019-09-25 PROCEDURE — 84484 ASSAY OF TROPONIN QUANT: CPT | Performed by: PHYSICIAN ASSISTANT

## 2019-09-25 PROCEDURE — 82550 ASSAY OF CK (CPK): CPT | Performed by: EMERGENCY MEDICINE

## 2019-09-25 PROCEDURE — 83880 ASSAY OF NATRIURETIC PEPTIDE: CPT | Performed by: EMERGENCY MEDICINE

## 2019-09-25 PROCEDURE — 99285 EMERGENCY DEPT VISIT HI MDM: CPT | Performed by: EMERGENCY MEDICINE

## 2019-09-25 PROCEDURE — 93010 ELECTROCARDIOGRAM REPORT: CPT | Performed by: INTERNAL MEDICINE

## 2019-09-25 PROCEDURE — 70450 CT HEAD/BRAIN W/O DYE: CPT

## 2019-09-25 PROCEDURE — 99285 EMERGENCY DEPT VISIT HI MDM: CPT

## 2019-09-25 PROCEDURE — 99220 PR INITIAL OBSERVATION CARE/DAY 70 MINUTES: CPT | Performed by: FAMILY MEDICINE

## 2019-09-25 PROCEDURE — 85025 COMPLETE CBC W/AUTO DIFF WBC: CPT | Performed by: EMERGENCY MEDICINE

## 2019-09-25 RX ORDER — ACETAMINOPHEN 325 MG/1
650 TABLET ORAL EVERY 6 HOURS PRN
Status: DISCONTINUED | OUTPATIENT
Start: 2019-09-25 | End: 2019-09-28 | Stop reason: HOSPADM

## 2019-09-25 RX ORDER — HALOPERIDOL 0.5 MG/1
0.5 TABLET ORAL EVERY 6 HOURS PRN
Status: DISCONTINUED | OUTPATIENT
Start: 2019-09-25 | End: 2019-09-28 | Stop reason: HOSPADM

## 2019-09-25 RX ORDER — PROCHLORPERAZINE MALEATE 10 MG
10 TABLET ORAL EVERY 6 HOURS PRN
Status: DISCONTINUED | OUTPATIENT
Start: 2019-09-25 | End: 2019-09-28 | Stop reason: HOSPADM

## 2019-09-25 RX ORDER — LISINOPRIL 2.5 MG/1
2.5 TABLET ORAL DAILY
Status: DISCONTINUED | OUTPATIENT
Start: 2019-09-26 | End: 2019-09-28 | Stop reason: HOSPADM

## 2019-09-25 RX ORDER — BISACODYL 10 MG
10 SUPPOSITORY, RECTAL RECTAL DAILY
Status: DISCONTINUED | OUTPATIENT
Start: 2019-09-26 | End: 2019-09-28 | Stop reason: HOSPADM

## 2019-09-25 RX ORDER — CALCIUM CARBONATE 200(500)MG
1000 TABLET,CHEWABLE ORAL DAILY PRN
Status: DISCONTINUED | OUTPATIENT
Start: 2019-09-25 | End: 2019-09-28 | Stop reason: HOSPADM

## 2019-09-25 RX ORDER — ACETAMINOPHEN 500 MG
500 TABLET ORAL EVERY 6 HOURS PRN
Status: DISCONTINUED | OUTPATIENT
Start: 2019-09-25 | End: 2019-09-25

## 2019-09-25 RX ORDER — LOPERAMIDE HYDROCHLORIDE 2 MG/1
2 CAPSULE ORAL 4 TIMES DAILY PRN
Status: DISCONTINUED | OUTPATIENT
Start: 2019-09-25 | End: 2019-09-28 | Stop reason: HOSPADM

## 2019-09-25 RX ORDER — ONDANSETRON 2 MG/ML
4 INJECTION INTRAMUSCULAR; INTRAVENOUS EVERY 6 HOURS PRN
Status: DISCONTINUED | OUTPATIENT
Start: 2019-09-25 | End: 2019-09-28 | Stop reason: HOSPADM

## 2019-09-25 RX ORDER — ASPIRIN 81 MG/1
81 TABLET ORAL DAILY
Status: DISCONTINUED | OUTPATIENT
Start: 2019-09-26 | End: 2019-09-28 | Stop reason: HOSPADM

## 2019-09-25 RX ADMIN — METOPROLOL TARTRATE 25 MG: 25 TABLET, FILM COATED ORAL at 20:56

## 2019-09-25 NOTE — ASSESSMENT & PLAN NOTE
· Patient was found on the floor in the assisted living facility [facility] and was sent to the emergency room  Patient is not able to provide any detailed history  Family reported that patient had multiple falls in the past and she is supposed to use a walker but she does not like using the walker  · Imaging studies reviewed  · Questionable syncope-monitor on telemetry overnight, obtain echocardiogram-suspected cardiac murmur  Difficult to auscultate late since the patient keeps on talking during the auscultation,    Since patient had multiple falls this is likely  another  fall  · 4 Elevated BNP noted-will obtain an echocardiogram   No signs of fluid overload

## 2019-09-25 NOTE — ASSESSMENT & PLAN NOTE
· Patient with advanced dementia and currently is a resident of a closed dementia unit in [facility] [place]  · Supportive care  · Patient was on hospice twice in the past but she improved and hospice was discontinued    · Family do not want any procedures

## 2019-09-25 NOTE — ED NOTES
Family at bedside  Ordered food for dinner   "I haven't eaten in 3 days "     Katherin Fernández, JOSE  09/25/19 1115

## 2019-09-25 NOTE — ED ATTENDING ATTESTATION
9/25/2019  I, Catherine Bradley MD, saw and evaluated the patient  I have discussed the patient with the resident/non-physician practitioner and agree with the resident's/non-physician practitioner's findings, Plan of Care, and MDM as documented in the resident's/non-physician practitioner's note, except where noted  All available labs and Radiology studies were reviewed  I was present for key portions of any procedure(s) performed by the resident/non-physician practitioner and I was immediately available to provide assistance  At this point I agree with the current assessment done in the Emergency Department    I have conducted an independent evaluation of this patient a history and physical is as follows:  Patient lives in the dementia unit a nursing facility she apparently was found lying on the floor unclear what happened patient complains of lower back pain denies neck pain or headache  She is pleasantly demented but able to follow commands  HEENT normocephalic atraumatic neck nontender lungs clear heart regular abdomen soft nontender back mild tenderness in the right sciatic and right para spinal muscular area  Impression fall  ED Course         Critical Care Time  Procedures

## 2019-09-25 NOTE — ED PROVIDER NOTES
History  Chief Complaint   Patient presents with    Weakness - Generalized     Per EMS, pt was found on floor  Pt denies falling and wanted "to take a nap on the floor"  Pt sent from [facility]     81 y/o F with dementia from [facility] presents after she was found on the floor this am  Pt states that she remembers sitting down on the floor and falling asleep, because she "wanted to nap on the floor"  Denies fever/chills, nausea/vomiting, lightheadedness/dizziness, numbness/weakness, headache, change in vision, URI symptoms, neck pain, chest pain, palpitations, shortness of breath, cough, back pain, flank pain, abdominal pain, diarrhea, hematochezia, melena, dysuria, hematuria  Pt is pan negative for ROS, however, she is demented                  Prior to Admission Medications   Prescriptions Last Dose Informant Patient Reported? Taking?    LORazepam (ATIVAN) 0 5 mg tablet   No No   Sig: Take 1 tablet (0 5 mg total) by mouth once for 1 dose 1/2 hour before urology appointment   Patient taking differently: Take 0 5 mg by mouth every 6 (six) hours as needed    Sodium Phosphates (ENEMA RE)   Yes No   Sig: Insert 1 application into the rectum as needed for constipation If no BM in 5 days   acetaminophen (TYLENOL) 500 mg tablet   Yes No   Sig: Take 500 mg by mouth every 6 (six) hours as needed for mild pain   aspirin (ECOTRIN LOW STRENGTH) 81 mg EC tablet   Yes No   Sig: Take 81 mg by mouth daily   bisacodyl (BISACODYL LAXATIVE) 10 mg suppository   Yes No   Sig: Insert 10 mg into the rectum daily   haloperidol (HALDOL) 0 5 mg tablet   Yes No   Sig: Take 0 5 mg by mouth every 6 (six) hours as needed for agitation   hyoscyamine (LEVSIN/SL) 0 125 mg SL tablet   Yes No   Sig: Take 0 125 mg by mouth every 6 (six) hours as needed for cramping   lisinopril (ZESTRIL) 5 mg tablet   No No   Sig: Take 0 5 tablets (2 5 mg total) by mouth daily   loperamide (IMODIUM) 2 mg capsule   Yes No   Sig: Take 2 mg by mouth as needed for diarrhea Take 1 dose after each bowel movement *max dose 16mg   magnesium hydroxide (MILK OF MAGNESIA) 400 mg/5 mL oral suspension   Yes No   Sig: Take by mouth daily as needed for constipation   metoprolol tartrate (LOPRESSOR) 25 mg tablet   Yes No   Sig: Take 25 mg by mouth every 12 (twelve) hours   prochlorperazine (COMPAZINE) 10 mg tablet   Yes No   Sig: Take 10 mg by mouth every 6 (six) hours as needed for nausea or vomiting      Facility-Administered Medications: None       Past Medical History:   Diagnosis Date    A-fib ([facility] [address] )     CKD (chronic kidney disease)     [facility]     Dementia     Hypertension     Urinary tract infection     Uterine prolapse        History reviewed  No pertinent surgical history  Family History   Problem Relation Age of Onset    No Known Problems Father     No Known Problems Mother      I have reviewed and agree with the history as documented  Social History     Tobacco Use    Smoking status: Former Smoker    Smokeless tobacco: Never Used   Substance Use Topics    Alcohol use: No    Drug use: No        Review of Systems   Unable to perform ROS: Dementia       Physical Exam  ED Triage Vitals   Temperature Pulse Respirations Blood Pressure SpO2   09/25/19 1038 09/25/19 0955 09/25/19 0955 09/25/19 0955 09/25/19 0955   98 2 °F (36 8 °C) 76 19 130/65 100 %      Temp Source Heart Rate Source Patient Position - Orthostatic VS BP Location FiO2 (%)   09/25/19 1038 09/25/19 0955 09/25/19 0955 09/25/19 0955 --   Rectal Monitor Lying Left arm       Pain Score       09/25/19 0955       No Pain             Orthostatic Vital Signs  Vitals:    09/25/19 1030 09/25/19 1100 09/25/19 1225 09/25/19 1300   BP: 158/74 170/75 143/74 162/97   Pulse: 63 67 85 74   Patient Position - Orthostatic VS: Lying Lying Lying Lying       Physical Exam   Constitutional: She is oriented to person, place, and time  She appears well-developed and well-nourished  No distress  HENT:   Head: Normocephalic and atraumatic  Right Ear: External ear normal    Left Ear: External ear normal    Nose: No sinus tenderness  No epistaxis  Mouth/Throat: No oropharyngeal exudate  Eyes: Pupils are equal, round, and reactive to light  Conjunctivae and EOM are normal  Right eye exhibits no discharge  Left eye exhibits no discharge  Neck: No JVD present  Cardiovascular: Normal rate, regular rhythm and intact distal pulses  Exam reveals no gallop and no friction rub  Murmur heard  Pulmonary/Chest: Effort normal and breath sounds normal  No stridor  No respiratory distress  She has no wheezes  She has no rales  Abdominal: Soft  Bowel sounds are normal  She exhibits no distension and no mass  There is no tenderness  There is no rebound and no guarding  Musculoskeletal: Normal range of motion  She exhibits no edema, tenderness or deformity  Arms:  Lymphadenopathy:     She has no cervical adenopathy  Neurological: She is alert and oriented to person, place, and time  She has normal strength  No cranial nerve deficit or sensory deficit  GCS eye subscore is 4  GCS verbal subscore is 5  GCS motor subscore is 6  Reflex Scores:       Patellar reflexes are 2+ on the right side and 2+ on the left side  UE and LE 5/5 strength, No focal neuro deficits  Skin: Skin is warm, dry and intact  Capillary refill takes less than 2 seconds  She is not diaphoretic  Psychiatric: She has a normal mood and affect  Her speech is normal and behavior is normal  Judgment and thought content normal    Nursing note and vitals reviewed        ED Medications  Medications - No data to display    Diagnostic Studies  Results Reviewed     Procedure Component Value Units Date/Time    Basic metabolic panel [[id number]]  (Abnormal) Collected:  09/25/19 1016    Lab Status:  Final result Specimen:  Blood from Arm, Right Updated:  09/25/19 1118     Sodium 137 mmol/L      Potassium 4 6 mmol/L      Chloride 109 mmol/L CO2 22 mmol/L      ANION GAP 6 mmol/L      BUN 40 mg/dL      Creatinine 2 26 mg/dL      Glucose 123 mg/dL      Calcium 8 9 mg/dL      eGFR 19 ml/min/1 73sq m     Narrative:       [place] guidelines for Chronic Kidney Disease (CKD):     Stage 1 with normal or high GFR (GFR > 90 mL/min/1 73 square meters)    Stage 2 Mild CKD (GFR = 60-89 mL/min/1 73 square meters)    Stage 3A Moderate CKD (GFR = 45-59 mL/min/1 73 square meters)    Stage 3B Moderate CKD (GFR = 30-44 mL/min/1 73 square meters)    Stage 4 Severe CKD (GFR = 15-29 mL/min/1 73 square meters)    Stage 5 End Stage CKD (GFR <15 mL/min/1 73 square meters)  Note: GFR calculation is accurate only with a steady state creatinine    CK (with reflex to MB) [[id number]]  (Normal) Collected:  09/25/19 1016    Lab Status:  Final result Specimen:  Blood from Arm, Right Updated:  09/25/19 1118     Total CK 97 U/L     Troponin I [[id number]]  (Normal) Collected:  09/25/19 1016    Lab Status:  Final result Specimen:  Blood from Arm, Right Updated:  09/25/19 1051     Troponin I <0 02 ng/mL     B-type natriuretic peptide [[id number]]  (Abnormal) Collected:  09/25/19 1016    Lab Status:  Final result Specimen:  Blood from Arm, Right Updated:  09/25/19 1051     NT-proBNP 4,003 pg/mL     CBC and differential [[id number]]  (Abnormal) Collected:  09/25/19 1016    Lab Status:  Final result Specimen:  Blood from Arm, Right Updated:  09/25/19 1026     WBC 8 17 Thousand/uL      RBC 4 10 Million/uL      Hemoglobin 12 7 g/dL      Hematocrit 40 9 %       fL      MCH 31 0 pg      MCHC 31 1 g/dL      RDW 13 0 %      MPV 10 1 fL      Platelets 785 Thousands/uL      nRBC 0 /100 WBCs      Neutrophils Relative 65 %      Immat GRANS % 0 %      Lymphocytes Relative 18 %      Monocytes Relative 10 %      Eosinophils Relative 6 %      Basophils Relative 1 %      Neutrophils Absolute 5 30 Thousands/µL      Immature Grans Absolute 0 02 Thousand/uL Lymphocytes Absolute 1 46 Thousands/µL      Monocytes Absolute 0 83 Thousand/µL      Eosinophils Absolute 0 48 Thousand/µL      Basophils Absolute 0 08 Thousands/µL     POCT urinalysis dipstick [[id number]]     Lab Status:  No result Specimen:  Urine                  CT chest abdomen pelvis wo contrast   ED Interpretation by [address] [place] [address] Street, DO (09/25 1329)      1  No acute traumatic injury to the chest, abdomen, or pelvis  2   Centrilobular emphysema with small lung nodules, one of which in the right lower lobe was not visualized previously, possibly obscured  If there is clinical concern, a 12 month follow-up CT is recommended  3   Cholelithiasis  4   Unchanged 3 8 cm infrarenal abdominal aortic aneurysm  5   Stable left adnexal cyst   If there is clinical concern, annual ultrasound follow-up is recommended  Considerations related to the patient's age and/or comorbidities may be used to alter these recommendations  Workstation performed: [id number]         Final Result by Jorge Quezada MD (09/25 1316)      1  No acute traumatic injury to the chest, abdomen, or pelvis  2   Centrilobular emphysema with small lung nodules, one of which in the right lower lobe was not visualized previously, possibly obscured  If there is clinical concern, a 12 month follow-up CT is recommended  3   Cholelithiasis  4   Unchanged 3 8 cm infrarenal abdominal aortic aneurysm  5   Stable left adnexal cyst   If there is clinical concern, annual ultrasound follow-up is recommended  Considerations related to the patient's age and/or comorbidities may be used to alter these recommendations  Workstation performed: [id number]         CT head without contrast   ED Interpretation by [address] [place] [address]  (09/25 1308)      No acute intracranial abnormality  Microangiopathic changes                    Workstation performed: [id number]         Final Result by Xavier Clemens MD (09/25 8879)      No acute intracranial abnormality  Microangiopathic changes  Workstation performed: [id number]         CT spine cervical without contrast   ED Interpretation by Damaris Hernandez DO (09/25 4895)      No cervical spine fracture or traumatic malalignment  Workstation performed: [id number]         Final Result by Xavier Clemens MD (09/25 5341)      No cervical spine fracture or traumatic malalignment  Workstation performed: [id number]               Procedures  ECG 12 Lead Documentation Only  Date/Time: 9/25/2019 12:28 PM  Performed by: Damaris Hernandez DO  Authorized by: Damaris Hernandez DO     Indications / Diagnosis:  Fall  Patient location:  ED  Previous ECG:     Previous ECG:  Compared to current    Comparison ECG info:  Aug2019    Similarity:  No change  Interpretation:     Interpretation: non-specific    Rate:     ECG rate:  65    ECG rate assessment: normal    Rhythm:     Rhythm: atrial fibrillation    Ectopy:     Ectopy: none    QRS:     QRS axis:  Normal    QRS intervals:  Normal  Conduction:     Conduction: normal    ST segments:     ST segments:  Normal  T waves:     T waves: normal              ED Course           Identification of Seniors at Risk      Most Recent Value   (ISAR) Identification of Seniors at Risk   Before the illness or injury that brought you to the Emergency, did you need someone to help you on a regular basis? 0 Filed at: 09/25/2019 0956   In the last 24 hours, have you needed more help than usual?  0 Filed at: 09/25/2019 7356   Have you been hospitalized for one or more nights during the past 6 months? 0 Filed at: 09/25/2019 0956   In general, do you see well?  0 Filed at: 09/25/2019 0956   In general, do you have serious problems with your memory? 0 Filed at: 09/25/2019 7099   Do you take more than three different medications every day?   1 Filed at: 09/25/2019 0956   ISAR Score  1 Filed at: 09/25/2019 0956                          [facility]  Number of Diagnoses or Management Options  Elevated brain natriuretic peptide (BNP) level: new and requires workup  Fall: new and requires workup  Diagnosis management comments: EKG, shows afib  TROP, BNP, CBC, BMP, CK, CT Head/neck/CAP    1  Congestive heart failure, BNP 4000  Admit to Medicine  Patient does not appear volume overloaded, we will not give Lasix at this time    2  Chronic renal disease, GFR 19 which is baseline    3  CT incidental findings spoke to patient about these and she is aware  1  No acute traumatic injury to the chest, abdomen, or pelvis  2   Centrilobular emphysema with small lung nodules, one of which in the right lower lobe was not visualized previously, possibly obscured  3   Cholelithiasis  4   Unchanged 3 8 cm infrarenal abdominal aortic aneurysm  5   Stable left adnexal cyst   If there is clinical concern, annual ultrasound follow-up is recommended  Disposition  Final diagnoses:   Elevated brain natriuretic peptide (BNP) level   Fall     Time reflects when diagnosis was documented in both MDM as applicable and the Disposition within this note     Time User Action Codes Description Comment    9/25/2019  1:47 PM Shannon Chaves Add [R79 89] Elevated brain natriuretic peptide (BNP) level     9/25/2019  1:48 PM Josafat Chaves Add [W19  XXXA] Fall       ED Disposition     ED Disposition Condition Date/Time Comment    Admit Stable Wed Sep 25, 2019  1:48 PM Case was discussed withSLIM and the patient's admission status was agreed to be Admission Status: observation status to the service of Dr Dori Flores   Follow-up Information    None         Patient's Medications   Discharge Prescriptions    No medications on file     No discharge procedures on file  ED Provider  Attending physically available and evaluated [place] Tavon SMART managed the patient along with the ED Attending      Electronically Signed by         Ifrah Washington DO  09/25/19 1348

## 2019-09-25 NOTE — H&P
H&P- Rajani Marion 6/27/1929, 80 y o  female MRN: [id number]    Unit/Bed#: ED 22 Encounter: [id number]    Primary Care Provider: Srinivas Austin DO   Date and time admitted to hospital: 9/25/2019  9:48 AM        * [address]  · Patient was found on the floor in the assisted living facility [facility] and was sent to the emergency room  Patient is not able to provide any detailed history  Family reported that patient had multiple falls in the past and she is supposed to use a walker but she does not like using the walker  · Imaging studies reviewed  · Questionable syncope-monitor on telemetry overnight, obtain echocardiogram-suspected cardiac murmur  Difficult to auscultate late since the patient keeps on talking during the auscultation,  Since patient had multiple falls this is likely  another  fall  · 4 Elevated BNP noted-will obtain an echocardiogram   No signs of fluid overload    CKD (chronic kidney disease) stage 4, GFR 15-29 ml/min ([facility])  Assessment & Plan  · Patient with CKD stage 4 and creatinine appears to be at baseline    HTN (hypertension), benign  Assessment & Plan  · Monitor blood pressure    Dementia  Assessment & Plan  · Patient with advanced dementia and currently is a resident of a closed dementia unit in [facility] [place]  · Supportive care  · Patient was on hospice twice in the past but she improved and hospice was discontinued  · Family do not want any procedures    VTE Prophylaxis: Pharmacologic VTE Prophylaxis contraindicated due to Multiple falls recently  / sequential compression device   Code Status:  DNR DNI  POLST: POLST form is not discussed and not completed at this time  Discussion with family:  Daughter in law  in the room    Anticipated Length of Stay:  Patient will be admitted on an Observation basis with an anticipated length of stay of < 2  midnights     Justification for Hospital Stay:  Fall    Total Time for Visit, including Counseling / Coordination of Care: 1 hour   Greater than 50% of this total time spent on direct patient counseling and coordination of care  Chief Complaint:   Found down on the floor    History of Present Illness:    Julius Reagan is a 80 y o  female who was sent to the emergency room after she was found down on the floor in the assisted living facility  Patient with advanced dementia and not able to provide any history  It appears that patient lives in the closed dementia unit in [address] [place] [address] and had multiple falls before  Today she was found on the floor  When asked what happened she reported that she wanted to take a nap on the floor  Patient not able to provide any history  Patient denies any chest pain  Patient denies any pain and she asked about leaving the hospital   Daughter-in-law in the room reported that patient had multiple falls recently and she is supposed to use a walker but the patient has not been using the walker since she does not like it  Daughter in law reported that patient refuses nail care  Patient has been on hospice in the past but was discontinued since she improved  Patient denied any pain but then she reported that last week she had a fall and has some pain on the  right hip but on detailed questioning she denies the pain again  Patient is very unreliable historian  Review of Systems:  Denies any specific complaints  Cannot be obtained due to her dementia  Review of Systems    Past Medical and Surgical History:     Past Medical History:   Diagnosis Date    [facility]fib ([facility] )     CKD (chronic kidney disease)     COPD (chronic obstructive pulmonary disease) ([facility] )     Dementia     Hypertension     Urinary tract infection     Uterine prolapse        History reviewed  No pertinent surgical history  Meds/Allergies:    Prior to Admission medications    Medication Sig Start Date End Date Taking?  Authorizing Provider   acetaminophen (TYLENOL) 500 mg tablet Take 500 mg by mouth every 6 (six) hours as needed for mild pain    Historical Provider, MD   aspirin (ECOTRIN LOW STRENGTH) 81 mg EC tablet Take 81 mg by mouth daily    Historical Provider, MD   bisacodyl (BISACODYL LAXATIVE) 10 mg suppository Insert 10 mg into the rectum daily    Historical Provider, MD   haloperidol (HALDOL) 0 5 mg tablet Take 0 5 mg by mouth every 6 (six) hours as needed for agitation    Historical Provider, MD   hyoscyamine (LEVSIN/SL) 0 125 mg SL tablet Take 0 125 mg by mouth every 6 (six) hours as needed for cramping    Historical Provider, MD   lisinopril (ZESTRIL) 5 mg tablet Take 0 5 tablets (2 5 mg total) by mouth daily 3/5/19   IRMA Robin   loperamide (IMODIUM) 2 mg capsule Take 2 mg by mouth as needed for diarrhea Take 1 dose after each bowel movement *max dose 16mg    Historical Provider, MD   LORazepam (ATIVAN) 0 5 mg tablet Take 1 tablet (0 5 mg total) by mouth once for 1 dose 1/2 hour before urology appointment  Patient taking differently: Take 0 5 mg by mouth every 6 (six) hours as needed  11/21/18 5/16/19  Anjel Heller MD   magnesium hydroxide (MILK OF MAGNESIA) 400 mg/5 mL oral suspension Take by mouth daily as needed for constipation    Historical Provider, MD   metoprolol tartrate (LOPRESSOR) 25 mg tablet Take 25 mg by mouth every 12 (twelve) hours    Historical Provider, MD   prochlorperazine (COMPAZINE) 10 mg tablet Take 10 mg by mouth every 6 (six) hours as needed for nausea or vomiting    Historical Provider, MD   Sodium Phosphates (ENEMA RE) Insert 1 application into the rectum as needed for constipation If no BM in 5 days    Historical Provider, MD     I have reviewed home medications with a medical source (PCP, Pharmacy, other)  Allergies: No Known Allergies    Social History:     Marital Status:     Occupation:   Patient Pre-hospital Living Situation:  Lives in a closed dementia unit  Patient Pre-hospital Level of Mobility:   Patient Pre-hospital Diet Restrictions:   Substance Use History:   Social History     Substance and Sexual Activity   Alcohol Use No     Social History     Tobacco Use   Smoking Status Former Smoker   Smokeless Tobacco Never Used     Social History     Substance and Sexual Activity   Drug Use No       Family History:    Family History   Problem Relation Age of Onset    No Known Problems Father     No Known Problems Mother        Physical Exam:     Vitals:   Blood Pressure: (!) 174/69 (09/25/19 1500)  Pulse: 77 (09/25/19 1500)  Temperature: 98 2 °F (36 8 °C) (09/25/19 1038)  Temp Source: Rectal (09/25/19 1038)  Respirations: 20 (09/25/19 1500)  Weight - Scale: 62 1 kg (136 lb 14 5 oz) (09/25/19 0955)  SpO2: 95 % (09/25/19 1500)    Physical Exam   Constitutional: She appears well-developed  No distress  HENT:   Head: Normocephalic and atraumatic  Eyes: Right eye exhibits no discharge  Left eye exhibits no discharge  Neck: No JVD present  Cardiovascular: Normal rate and regular rhythm  Exam reveals no friction rub  No murmur (Possible murmur  Patient keeps on talking during the auscultation) heard  Pulmonary/Chest: Effort normal  No stridor  No respiratory distress  Abdominal: Soft  Bowel sounds are normal    Musculoskeletal: She exhibits tenderness (Right hip tenderness present)  Neurological: She is alert  No cranial nerve deficit  Coordination normal    Skin: Skin is warm  Additional Data:     Lab Results: I have personally reviewed pertinent reports  Results from last 7 days   Lab Units 09/25/19  1016   WBC Thousand/uL 8 17   HEMOGLOBIN g/dL 12 7   HEMATOCRIT % 40 9   PLATELETS Thousands/uL 358   NEUTROS PCT % 65   LYMPHS PCT % 18   MONOS PCT % 10   EOS PCT % 6     Results from last 7 days   Lab Units 09/25/19  1016   SODIUM mmol/L 137   POTASSIUM mmol/L 4 6   CHLORIDE mmol/L 109*   CO2 mmol/L 22   BUN mg/dL 40*   CREATININE mg/dL 2 26*   ANION GAP mmol/L 6   CALCIUM mg/dL 8 9   GLUCOSE RANDOM mg/dL 123                       Imaging:  I have personally reviewed pertinent reports  CT chest abdomen pelvis wo contrast   ED Interpretation by Bryn Mendosa DO (09/25 1329)      1  No acute traumatic injury to the chest, abdomen, or pelvis  2   Centrilobular emphysema with small lung nodules, one of which in the right lower lobe was not visualized previously, possibly obscured  If there is clinical concern, a 12 month follow-up CT is recommended  3   Cholelithiasis  4   Unchanged 3 8 cm infrarenal abdominal aortic aneurysm  5   Stable left adnexal cyst   If there is clinical concern, annual ultrasound follow-up is recommended  Considerations related to the patient's age and/or comorbidities may be used to alter these recommendations  Workstation performed: [id number]         Final Result by Steven Matamoros MD (09/25 1316)      1  No acute traumatic injury to the chest, abdomen, or pelvis  2   Centrilobular emphysema with small lung nodules, one of which in the right lower lobe was not visualized previously, possibly obscured  If there is clinical concern, a 12 month follow-up CT is recommended  3   Cholelithiasis  4   Unchanged 3 8 cm infrarenal abdominal aortic aneurysm  5   Stable left adnexal cyst   If there is clinical concern, annual ultrasound follow-up is recommended  Considerations related to the patient's age and/or comorbidities may be used to alter these recommendations  Workstation performed: [id number]         CT head without contrast   ED Interpretation by Bryn Mendosa DO (09/25 1308)      No acute intracranial abnormality  Microangiopathic changes  Workstation performed: [id number]         Final Result by Steven Matamoros MD (09/25 1291)      No acute intracranial abnormality  Microangiopathic changes                    Workstation performed: [id number]         CT spine cervical without contrast   ED Interpretation by Nai Betts JOHNNY Chaves DO (09/25 7157)      No cervical spine fracture or traumatic malalignment  Workstation performed: [id number]         Final Result by Kike Calix MD (09/25 1084)      No cervical spine fracture or traumatic malalignment  Workstation performed: [id number]             EKG, Pathology, and Other Studies Reviewed on Admission:   · EKG:     Allscripts / Epic Records Reviewed: Yes     ** Please Note: This note has been constructed using a voice recognition system   **

## 2019-09-26 ENCOUNTER — APPOINTMENT (OUTPATIENT)
Dept: NON INVASIVE DIAGNOSTICS | Facility: HOSPITAL | Age: 84
End: 2019-09-26
Payer: MEDICARE

## 2019-09-26 PROCEDURE — 97530 THERAPEUTIC ACTIVITIES: CPT

## 2019-09-26 PROCEDURE — 97167 OT EVAL HIGH COMPLEX 60 MIN: CPT

## 2019-09-26 PROCEDURE — 99225 PR SBSQ OBSERVATION CARE/DAY 25 MINUTES: CPT | Performed by: INTERNAL MEDICINE

## 2019-09-26 PROCEDURE — G8978 MOBILITY CURRENT STATUS: HCPCS

## 2019-09-26 PROCEDURE — 93306 TTE W/DOPPLER COMPLETE: CPT

## 2019-09-26 PROCEDURE — 97163 PT EVAL HIGH COMPLEX 45 MIN: CPT

## 2019-09-26 PROCEDURE — G8979 MOBILITY GOAL STATUS: HCPCS

## 2019-09-26 PROCEDURE — G8987 SELF CARE CURRENT STATUS: HCPCS

## 2019-09-26 PROCEDURE — 93306 TTE W/DOPPLER COMPLETE: CPT | Performed by: INTERNAL MEDICINE

## 2019-09-26 PROCEDURE — G8988 SELF CARE GOAL STATUS: HCPCS

## 2019-09-26 RX ADMIN — ASPIRIN 81 MG: 81 TABLET, COATED ORAL at 09:37

## 2019-09-26 RX ADMIN — LISINOPRIL 2.5 MG: 2.5 TABLET ORAL at 09:37

## 2019-09-26 RX ADMIN — METOPROLOL TARTRATE 25 MG: 25 TABLET, FILM COATED ORAL at 20:25

## 2019-09-26 RX ADMIN — METOPROLOL TARTRATE 25 MG: 25 TABLET, FILM COATED ORAL at 09:37

## 2019-09-26 RX ADMIN — HALOPERIDOL 0.5 MG: 0.5 TABLET ORAL at 00:32

## 2019-09-26 NOTE — PHYSICAL THERAPY NOTE
Physical Therapy Evaluation:    2 forms of pt ID verified:name,birthdate and pt ID blanca    Patient's Name: Jessie Manjarrez    Admitting Diagnosis  Weakness [R53 1]  Injury, unspecified, initial encounter [T14 90XA]  Elevated brain natriuretic peptide (BNP) level [R79 89]    Problem List  Patient Active Problem List   Diagnosis    TRUNG (acute kidney injury) ([place] [address] )    Dementia    COPD (chronic obstructive pulmonary disease) ([place] [address] )    Atrial fibrillation ([place] [address] )    HTN (hypertension), benign    Brain mass    Urinary retention    Uterine prolapse    Acute cystitis without hematuria    Recurrent epistaxis    CKD (chronic kidney disease) stage 4, GFR 15-29 ml/min ([place] [address] )    Fall       Past Medical History  Past Medical History:   Diagnosis Date    A-fib ([place] [address] )     CKD (chronic kidney disease)     COPD (chronic obstructive pulmonary disease) ([place] [address] )     Dementia     Hypertension     Urinary tract infection     Uterine prolapse        Past Surgical History  History reviewed  No pertinent surgical history  09/26/19 1101   Note Type   Note type Eval/Treat   Pain Assessment   Pain Assessment No/denies pain   Pain Score No Pain   Home Living   Type of Home Other (Comment)  ([facility] locked dementia unit)   Home Layout One level   Home Equipment Walker  (use of RW for mobility PTA)   Additional Comments PTA pt was supposed to use RW for mobility,per CM and documentation pt chooses to not use DME during mobility with multiple falls;pt reports "I just like to sit on the ground"   Prior Function   Level of [place] Needs assistance with ADLs and functional mobility   Lives With   (staff and residents at facility)   [place] Help From   (staff at facility)   ADL Assistance Needs assistance   IADLs Needs assistance   Falls in the last 6 months   (multiple falls per pts chart)   Restrictions/Precautions   Other Precautions Cognitive; Chair Alarm;Multiple lines;Telemetry; Fall Risk   General   Additional Pertinent History weakness,h/o falls   Family/Caregiver Present No   Cognition   Overall Cognitive Status Impaired   Arousal/Participation Responsive   Attention Difficulty attending to directions   Orientation Level Oriented to person;Disoriented to place; Disoriented to time;Disoriented to situation   Following Commands Follows one step commands with increased time or repetition  (2* dec cognition,dec safety awareness)   RLE Assessment   RLE Assessment   (3+/5 grossly throughout)   LLE Assessment   LLE Assessment   (3+/5 grossly throughout)   Coordination   Movements are Fluid and Coordinated 0   Coordination and Movement Description ataxic and unsteady,dec BLE step length,shuffling gait pattern,forward flexed posture   Sensation WFL   Light Touch   RLE Light Touch Grossly intact   LLE Light Touch Grossly intact   Bed Mobility   Supine to Sit Unable to assess  (pt sitting in chair pre and post mobility)   Transfers   Sit to Stand 3  Moderate assistance   Additional items Assist x 1; Armrests; Increased time required;Verbal cues   Stand to Sit 3  Moderate assistance   Additional items Assist x 1; Armrests; Impulsive;Verbal cues  (for safety,education and control descent;premature transfer)   Ambulation/Elevation   Gait pattern Poor UE support;Narrow ANDRÉS; Forward Flexion; Shuffling; Inconsistent altaf; Foward flexed; Short stride; Ataxia; Step to;Excessively slow   Gait Assistance 3  Moderate assist   Additional items Assist x 1;Verbal cues; Tactile cues   Assistive Device Rolling walker  (initially use of RW;pt places RW to the side)   Distance 10 feet with use of RW on tile surface;pt prefers not to use RW despite gait instability and ataxic gait pattern;retropulsion at this time with LOB x2 needing modAx1 with use of RW   Balance   Static Sitting Good  (in chair with chair alarm intact)   Dynamic Sitting Poor   Static Standing Poor   Dynamic Standing Poor   Ambulatory Poor   Endurance Deficit   Endurance Deficit Yes   Endurance Deficit Description weakness,fatigue,slow mobility   Activity Tolerance   Activity Tolerance Patient limited by fatigue  (poor->fair)   Medical Staff Made Aware OT Loren Connors)   Nurse Made Aware yes   Assessment   Prognosis Fair   Problem List Decreased strength;Decreased endurance; Impaired balance;Decreased mobility; Decreased cognition; Impaired judgement;Decreased safety awareness;Decreased skin integrity   Assessment pt is a 81 y/o female admitted to [facility] weakness,h/o falls  pt lives at [facility] locked dementia unit with use of RW for mobility  Per pt's documentation, pt chooses not to use RW and  has had multiple falls  Pt currently is not at functional mobility baseline,needs Ax1 for mobility with use of RW,multiple lines,chair alarm use,h/o falls,masimo monitoring,ataxic and unsteady gait pattern and dec cognition  pt demonstrates moderate deficits during functional mobility and gait including dec endurance,dec BLE strength,ataxic and unsteady gait pattern,dec balance,dec cognition (baseline?) and needs modAx1 for transfers and gait with use of RW  Pt would cont to benefit from skilled inpt PT services to maximize functional independence     Goals   Patient Goals to go to the    STG Expiration Date 10/06/19   Short Term Goal #1 in 7-10 days:(1) Pt will be able to ambulate greater than 150 feet with use of appropriate DME on various surfaces without LOB and no rest breaks needing minAx1->S level of A in order to A pt to return to PLOF, (2) activity tolerance:45 mins/45mins, (3) pt will be able to perform sit to stand transfers needing minAx1->S level of A to and from various surfaces consistently in order to return to PLOF, (4) pt will be able to perform BM needing minAx1->S level of A to A pt to return to PLOF, (5) AAROM->AROM with BLE therapeutic ex HEP in various positions to A pt to inc balance,strength,mobility,endurance (6) inc balance 1/2 grade in order to dec fall risk, (8) cont to provide pt and pt family education for safe D/C planning, (9) inc BLE strength 1/2 to 1 full grade in order to A pt to inc balance,strength,mobility,endurance    PT Treatment Day 1   Plan   Treatment/Interventions Functional transfer training;LE strengthening/ROM; Therapeutic exercise; Endurance training;Patient/family training;Equipment eval/education; Bed mobility;Gait training;Spoke to nursing;Spoke to case management;OT   PT Frequency Other (Comment)  (3-5x/wk)   Recommendation   Recommendation Other (Comment)  (inpt rhb vs return to previous environment PENDING mobility)   Equipment Recommended Walker   Barthel Index   Feeding 5   Bathing 0   Grooming Score 0   Dressing Score 5   Bladder Score 5   Bowels Score 10   Toilet Use Score 5   Transfers (Bed/Chair) Score 5   Mobility (Level Surface) Score 0   Stairs Score 0   Barthel Index Score 35   Skilled PT recommended while in hospital and upon DC to progress pt toward treatment goals  Marisol Moffett, PT, DPT    Time In:1101  Time Out:1116  Total Time: 15 mins      S:  Pt willing and agreeable to work with PT and to participate in therapy intervention  Pt requests to use the BR to void;"I have to go"  O:  Pt able to perform sit to stand transfers to and from various surfaces needing modAx1  Frequent and maximal verbal and physical instruction given to pt concerning use of GB in BR,armrests on chair for proper hand placement and dec premature stand to sit transfer  Pt able to ambulate 15 feet x2 with B HHA on tile surface;pt declines use of RW during tx session  LOB x2 needing modAx1 to recover with posterior lean throughout despite verbal and tactile instruction  Static sit on toilet lasting 2-3 mins, static stand by BR sink to wash hands needing minAx1 to prevent retropulsion at sink area poor(+) balance score  A:  Pt cont to be a fall risk with declining to use of RW,retropulsion and posterior lean during mobility and static stand   Pt has dec safety awareness during all modes of mobility needing frequent and max verbal and physical instruction throughout  Pt would cont to benefit from skilled inpt PT services to maximize functional independence  P:  Cont skilled inpt PT 3-5x/week for strength,education,endurance,balance and mobility      Martin Mc, PT

## 2019-09-26 NOTE — SOCIAL WORK
CM contacted nurse at [facility] ([phone]) to discuss pt baseline functioning at the facility  Per nurse Anne Tejeda pt was a min assist x1 for ADLs and pt could ambulate independently with the use of a rw  Anne Tejeda indicated if pt would need more assistance than that at time of d/c the resident care coordinator would need to be involved for referral to higher level of care at facility  CM spoke to resident care coordinator [facility] ([phone]) in regards to pt current level of functioning  Per Etienne, pt could return to [facility] on a different floor for a higher level of care but she would need PT/OT and a new Medical Eval completed by the doctor and faxed [phone])  CM spoke to pt son Aretha Krabbe for their wishes on pt returning to [facility] on a different floor vs STR referral  Aretha Krabbe mentioned potential hospice for pt  Aretha Krabbe stated he will talk to the family about options and contact CM when they have a decision  PCP: Dr Maryjo Olguin: [address]: Amina Quijano (son) [phone] or [phone] or  Lul Kramer (son) [phone] or [phone]  POA is Son Aretha Krabbe  Pt will need transportation back to facility at time of d/c

## 2019-09-26 NOTE — PLAN OF CARE
Problem: Potential for Falls  Goal: Patient will remain free of falls  Description  INTERVENTIONS:  - Assess patient frequently for physical needs  -  Identify cognitive and physical deficits and behaviors that affect risk of falls  -  [place] fall precautions as indicated by assessment   - Educate patient/family on patient safety including physical limitations  - Instruct patient to call for assistance with activity based on assessment  - Modify environment to reduce risk of injury  - Consider OT/PT consult to assist with strengthening/mobility  Outcome: Progressing     Problem: Prexisting or High Potential for Compromised Skin Integrity  Goal: Skin integrity is maintained or improved  Description  INTERVENTIONS:  - Identify patients at risk for skin breakdown  - Assess and monitor skin integrity  - Assess and monitor nutrition and hydration status  - Monitor labs   - Assess for incontinence   - Turn and reposition patient  - Assist with mobility/ambulation  - Relieve pressure over bony prominences  - Avoid friction and shearing  - Provide appropriate hygiene as needed including keeping skin clean and dry  - Evaluate need for skin moisturizer/barrier cream  - Collaborate with interdisciplinary team   - Patient/family teaching  - Consider wound care consult   Outcome: Progressing     Problem: Nutrition/Hydration-ADULT  Goal: Nutrient/Hydration intake appropriate for improving, restoring or maintaining nutritional needs  Description  Monitor and assess patient's nutrition/hydration status for malnutrition  Collaborate with interdisciplinary team and initiate plan and interventions as ordered  Monitor patient's weight and dietary intake as ordered or per policy  Utilize nutrition screening tool and intervene as necessary  Determine patient's food preferences and provide high-protein, high-caloric foods as appropriate       INTERVENTIONS:  - Monitor oral intake, urinary output, labs, and treatment plans  - Assess nutrition and hydration status and recommend course of action  - Evaluate amount of meals eaten  - Assist patient with eating if necessary   - Allow adequate time for meals  - Recommend/ encourage appropriate diets, oral nutritional supplements, and vitamin/mineral supplements  - Order, calculate, and assess calorie counts as needed  - Recommend, monitor, and adjust tube feedings and TPN/PPN based on assessed needs  - Assess need for intravenous fluids  - Provide specific nutrition/hydration education as appropriate  - Include patient/family/caregiver in decisions related to nutrition  Outcome: Progressing     Problem: CARDIOVASCULAR - ADULT  Goal: Maintains optimal cardiac output and hemodynamic stability  Description  INTERVENTIONS:  - Monitor I/O, vital signs and rhythm  - Monitor for S/S and trends of decreased cardiac output  - Administer and titrate ordered vasoactive medications to optimize hemodynamic stability  - Assess quality of pulses, skin color and temperature  - Assess for signs of decreased coronary artery perfusion  - Instruct patient to report change in severity of symptoms  Outcome: Progressing  Goal: Absence of cardiac dysrhythmias or at baseline rhythm  Description  INTERVENTIONS:  - Continuous cardiac monitoring, vital signs, obtain 12 lead EKG if ordered  - Administer antiarrhythmic and heart rate control medications as ordered  - Monitor electrolytes and administer replacement therapy as ordered  Outcome: Progressing     Problem: SKIN/TISSUE INTEGRITY - ADULT  Goal: Skin integrity remains intact  Description  INTERVENTIONS  - Identify patients at risk for skin breakdown  - Assess and monitor skin integrity  - Assess and monitor nutrition and hydration status  - Monitor labs (i e  albumin)  - Assess for incontinence   - Turn and reposition patient  - Assist with mobility/ambulation  - Relieve pressure over bony prominences  - Avoid friction and shearing  - Provide appropriate hygiene as needed including keeping skin clean and dry  - Evaluate need for skin moisturizer/barrier cream  - Collaborate with interdisciplinary team (i e  Nutrition, Rehabilitation, etc )   - Patient/family teaching  Outcome: Progressing

## 2019-09-26 NOTE — OCCUPATIONAL THERAPY NOTE
Occupational Therapy Evaluation      Bhavesh Feuntes    9/26/2019    Principal Problem:    Fall  Active Problems:    Dementia    HTN (hypertension), benign    CKD (chronic kidney disease) stage 4, GFR 15-29 ml/min ([facility])      Past Medical History:   Diagnosis Date    A-fib ([facility] [address]     CKD (chronic kidney disease)     COPD (chronic obstructive pulmonary disease) ([facility])     Dementia     Hypertension     Urinary tract infection     Uterine prolapse        History reviewed  No pertinent surgical history  09/26/19 1058   Note Type   Note type Eval/Treat   Restrictions/Precautions   Weight Bearing Precautions Per Order No   Other Precautions Cognitive; Chair Alarm; Bed Alarm; Fall Risk;Hard of hearing   Pain Assessment   Pain Assessment No/denies pain   Pain Score No Pain   Home Living   Type of Home Other (Comment)  ([facility] locked dementia unit assisted living)   Home Layout Performs ADLs on one level   Additional Comments Pt presents confused and is a poor historian - all home setup and PLOF obtained via CM report  Per CM, Pt resides at [facility] unit assisted living  Per EMR, Pt is supposed to ambulate w/ RW, however chooses not to and has had multiple recent falls  Unknown level of A w/ self care PTA  Prior Function   Comments Pt presents confused and is a poor historian - all home setup and PLOF obtained via CM report  Per CM, Pt resides at [facility] unit assisted living  Per EMR, Pt is supposed to ambulate w/ RW, however chooses not to and has had multiple recent falls  Unknown level of A w/ self care PTA  Lifestyle   Autonomy Pt unable to recall 2' cog deficits      Reciprocal Relationships Pt lives w/ facility staff   Intrinsic Gratification Pt reports enjoying eating   Psychosocial   Psychosocial (WDL) WDL   Subjective   Subjective "I'm not dizzy, are you?"   ADL   Where Assessed Chair   Eating Assistance 5  Supervision/Setup   Grooming Assistance 4  Minimal Assistance   UB Bathing Assistance 3  Moderate Assistance   LB Bathing Assistance 2  Maximal Assistance   UB Dressing Assistance 3  Moderate Assistance   LB Dressing Assistance 2  Maximal Assistance   Toileting Assistance  3  Moderate Assistance   Bed Mobility   Supine to Sit Unable to assess   Sit to Supine Unable to assess   Additional Comments Pt seated OOB in chair upon OT arrival  Pt seated in chair w/ chair alarm acitvated and all needs in reach s/p OT session  Transfers   Sit to Stand 4  Minimal assistance   Additional items Assist x 2; Increased time required;Verbal cues;Armrests; Impulsive   Stand to Sit 4  Minimal assistance   Additional items Assist x 2; Increased time required;Verbal cues;Armrests; Impulsive   Toilet transfer 3  Moderate assistance   Additional items Assist x 2; Increased time required;Commode;Verbal cues; Impulsive;Armrests  (BSC over toilet)   Additional Comments Trialed transfers w/ RW, however Pt unable to motor plan or problem solve ambulation and demonstrated unsafe RW management  Pt then performed all transfers w/ HHAx2  Max VC and TC required for safe/proper hand placement, initiation, and sequencing t/o all transfers  Pt highly impulsive upon stand>sit, and would often go to sit down when sitting surface is not in reach  Functional Mobility   Functional Mobility 4  Minimal assistance  (Min Ax2)   Additional Comments Pt demonstrated short distance household mobility in room chair<>bathroom w/ Min Ax2 HHA  Pt required continuous VC and TC for redirection to task, initiation, and sequencing t/o mobility      Additional items Hand hold assistance   Balance   Static Sitting Fair -   Dynamic Sitting Poor +   Static Standing Poor   Dynamic Standing Poor   Ambulatory Poor   Activity Tolerance   Activity Tolerance Patient limited by fatigue;Treatment limited secondary to medical complications (Comment)  (cognitive deficits)   Medical Staff Made Aware PT Phoebe Shutters; CM for safe dispo planning   Nurse Made Aware RN Scheryl Brunner cleared Pt for OT eval RUE Assessment   RUE Assessment WFL  (AROM WFL)   LUE Assessment   LUE Assessment WFL  (AROM WFL)   Hand Function   Gross Motor Coordination Functional   Sensation   Light Touch No apparent deficits   Cognition   Overall Cognitive Status Impaired   Arousal/Participation Alert; Cooperative;Lethargic   Attention Difficulty attending to directions   Orientation Level Oriented to person;Disoriented to situation;Disoriented to time;Disoriented to place   Memory Decreased recall of precautions;Decreased recall of recent events;Decreased short term memory   Following Commands Follows one step commands inconsistently   Comments Pt presents pleasantly confused and is a poor historian - unable to recall all PLOF and home set up (information obtained via CM report and EMR)  Pt presents w/ decreased insight into deficits and decreased safety awareness  Pt required continuous VC and TC for all initiation, sequencing, and redirection to task  Pt often disregarded therapist's cueing for safety and is impulsive with all transfers and mobility  Assessment   Limitation Decreased ADL status; Decreased UE ROM; Decreased UE strength;Decreased Safe judgement during ADL;Decreased cognition;Decreased endurance;Decreased high-level ADLs   Prognosis Fair   Assessment Pt is a 81 yo female seen for OT eval s/p adm to [facility] w/ s/p fall on floor in nursing home dx'd w/ falls  Comorbidities include a h/o dementia, HTN, CKD stage 4, TRUNG, COPD, brain mass, A-fib, urinary retention, uterine prolapse, acute cystitis w/o heaturia, recurrent epistaxis  Pt with active OT orders and up with assistance  orders  Pt presents confused and is a poor historian - all home setup and PLOF obtained via CM report  Per CM, Pt resides at [facility] assisted living  Per EMR, Pt is supposed to ambulate w/ RW, however chooses not to and has had multiple recent falls  Unknown level of A w/ self care PTA   Pt is currently demonstrating the following occupational deficits: Mod A UB bathing/dressing, Max A LB bathing/dressing, Mod A toileting, Min Ax2 functional transfers and mobility w/ HHAx2  These deficits that are impacting pt's baseline areas of occupation are a result of the following impairments: endurance, activity tolerance, functional mobility, forward functional reach, balance, functional standing tolerance, unsupportive home environment, decreased I w/ ADLS/IADLS, strength, cognitive impairments, decreased safety awareness, decreased insight into deficits and impaired fine motor skills  The following Occupational Performance Areas to address include: grooming, bathing/shower, toilet hygiene, dressing, health maintenance, functional mobility and clothing management  Pt scored overall 35/100 on the Barthel Index  Based on the aforementioned OT evaluation, functional performance deficits, and assessments, pt has been identified as a high complexity evaluation  Recommend STR or return to facility if facility staff is able to provide increased S and A upon D/C  Pt to continue to benefit from acute immediate OT services to address the following goals 3-5x/week to  w/in 7-10 days:    Goals   Patient Goals To finish eating her pancakes   LTG Time Frame 7-10   Long Term Goal #1 Refer to goals below   Plan   Treatment Interventions ADL retraining;Functional transfer training;UE strengthening/ROM; Endurance training;Cognitive reorientation;Patient/family training;Equipment evaluation/education; Compensatory technique education; Activityengagement; Energy conservation   Goal Expiration Date 10/06/19   OT Frequency 3-5x/wk   Recommendation   OT Discharge Recommendation Short Term Rehab  (or return to facility if able to provide increased A)   OT - OK to Discharge Yes  (when medically cleared)   Barthel Index   Feeding 5   Bathing 0   Grooming Score 0   Dressing Score 5   Bladder Score 5   Bowels Score 10   Toilet Use Score 5   Transfers (Bed/Chair) Score 5   Mobility (Level Surface) Score 0   Stairs Score 0   Barthel Index Score 35   Modified [place] Scale   Modified [place] Scale 4       GOALS    1) Pt will improve activity tolerance to G for min 30 min txment sessions for increase engagement in functional tasks    2) Pt will complete UB/LB dressing/self care w/ S using adaptive device and DME as needed    3) Pt will complete bathing w/ S w/ use of AE and DME as needed    4) Pt will complete toileting w/ S w/ G hygiene/thoroughness using DME as needed    5) Pt will improve functional transfers to S  on/off all surfaces using DME/HHA as needed w/ G balance/safety     6) Pt will improve functional mobility during ADL/leisure tasks to S using DME/HHA as needed w/ G balance/safety     8) Pt will be attentive 100% of the time during ongoing cognitive assessment w/ G participation to assist w/ safe d/c planning/recommendations    9) Pt will demonstrate G carryover of pt/caregiver education and training as appropriate w/o cues w/ good tolerance to increase safety during functional tasks    10) Pt will demonstrate 100% carryover of energy conservation techniques t/o functional I/ADL/leisure tasks w/o cues s/p skilled education to increase endurance during functional tasks           Bar Arthur MS, OTR/L

## 2019-09-26 NOTE — ASSESSMENT & PLAN NOTE
· Patient with advanced dementia and currently is a resident of a closed dementia unit in [facility] [place]  · Supportive care  · Patient was on hospice twice in the past but she improved and hospice was discontinued    · Family do not want any procedures

## 2019-09-26 NOTE — ASSESSMENT & PLAN NOTE
· Patient was found on the floor in the assisted living facility [facility] and was sent to the emergency room  Patient is not able to provide any detailed history  Family reported that patient had multiple falls in the past and she is supposed to use a walker but she does not like using the walker  · Imaging studies reviewed  · Questionable syncope-monitor on telemetry overnight, obtain echocardiogram-suspected cardiac murmur  Difficult to auscultate late since the patient keeps on talking during the auscultation,    Since patient had multiple falls this is likely  another  fall  · 4 Elevated BNP noted-will obtain an echocardiogram   No signs of fluid overload  · Case management for placement

## 2019-09-26 NOTE — PHYSICIAN ADVISOR
Current patient class: Observation  The patient is currently on Hospital Day: 2 at [address] [place] [address]        The patient was admitted to the hospital  on N/A at N/A for the following diagnosis:  Weakness [R53 1]  Injury, unspecified, initial encounter [T14 90XA]  Elevated brain natriuretic peptide (BNP) level [R79 89]     After review of the relevant documentation, labs, vital signs and test results, the patient is most appropriate for OBSERVATION STATUS  Rationale is as follows:    Notes reviewed  No updates from previous  Case management was consulted for placement  The patient is receiving her usual oral medication  Vital signs are stable  She had an echo performed today  No additional testing is anticipated  Social work note indicates that the plan is for the patient to return to [facility] on a different floor that would allow more assistance and supervision  Based on the current documentation the determination is for continued observation  If the patient has any acute medical issues or is medically unstable for discharge then this should be documented in the provider note  The patients vitals on arrival were ED Triage Vitals   Temperature Pulse Respirations Blood Pressure SpO2   09/25/19 1038 09/25/19 0955 09/25/19 0955 09/25/19 0955 09/25/19 0955   98 2 °F (36 8 °C) 76 19 130/65 100 %      Temp Source Heart Rate Source Patient Position - Orthostatic VS BP Location FiO2 (%)   09/25/19 1038 09/25/19 0955 09/25/19 0955 09/25/19 0955 --   Rectal Monitor Lying Left arm       Pain Score       09/25/19 0955       No Pain           Past Medical History:   Diagnosis Date    A-fib ([address] )     CKD (chronic kidney disease)     COPD (chronic obstructive pulmonary disease) (HCC)     Dementia     Hypertension     Urinary tract infection     Uterine prolapse      History reviewed  No pertinent surgical history          Consults have been placed to:   IP CONSULT TO CASE MANAGEMENT    Vitals:    09/26/19 0018 09/26/19 0328 09/26/19 0802 09/26/19 1100   BP: 170/84 132/77 153/77 117/58   BP Location: Right arm      Pulse:  90 64 72   Resp:  18 16 18   Temp:  98 2 °F (36 8 °C) 98 3 °F (36 8 °C) 97 6 °F (36 4 °C)   TempSrc:  Oral  Oral   SpO2:  96% 95% 92%   Weight:           Most recent labs:    Recent Labs     09/25/19  1016  09/25/19  2312   WBC 8 17  --   --    HGB 12 7  --   --    HCT 40 9  --   --      --   --    K 4 6  --   --    CALCIUM 8 9  --   --    BUN 40*  --   --    CREATININE 2 26*  --   --    TROPONINI <0 02   < > <0 02   CKTOTAL 97  --   --     < > = values in this interval not displayed         Scheduled Meds:  Current Facility-Administered Medications:  acetaminophen 650 mg Oral Q6H PRN Erwin Limb, MD   aspirin 81 mg Oral Daily Domi Limb, MD   bisacodyl 10 mg Rectal Daily Domi Limb, MD   calcium carbonate 1,000 mg Oral Daily PRN Odmi Limb, MD   haloperidol 0 5 mg Oral Q6H PRN Erwin Limb, MD   hyoscyamine 0 125 mg Oral Q6H PRN Erwin Limb, MD   lisinopril 2 5 mg Oral Daily Erwin Limb, MD   loperamide 2 mg Oral 4x Daily PRN Erwin Limb, MD   magnesium hydroxide 30 mL Oral Daily PRN Domi Limb, MD   metoprolol tartrate 25 mg Oral Q12H Gregory Alonso MD   ondansetron 4 mg Intravenous Q6H PRN Erwin Limb, MD   prochlorperazine 10 mg Oral Q6H PRN Erwin Limb, MD     Continuous Infusions:   PRN Meds:   acetaminophen    calcium carbonate    haloperidol    hyoscyamine    loperamide    magnesium hydroxide    ondansetron    prochlorperazine

## 2019-09-26 NOTE — PROGRESS NOTES
Progress Note - Anthony Gordon 1929, 80 y o  female MRN: [id number]    Unit/Bed#: [facility] 530-01 Encounter: [id number]    Primary Care Provider: Randolph Malhotra DO   Date and time admitted to hospital: 2019  9:48 AM        * [address]  · Patient was found on the floor in the assisted living facility [facility] and was sent to the emergency room  Patient is not able to provide any detailed history  Family reported that patient had multiple falls in the past and she is supposed to use a walker but she does not like using the walker  · Imaging studies reviewed  · Questionable syncope-monitor on telemetry overnight, obtain echocardiogram-suspected cardiac murmur  Difficult to auscultate late since the patient keeps on talking during the auscultation,  Since patient had multiple falls this is likely  another  fall  · 4 Elevated BNP noted-will obtain an echocardiogram   No signs of fluid overload  · Case management for placement    CKD (chronic kidney disease) stage 4, GFR 15-29 ml/min ([facility])  Assessment & Plan  · Patient with CKD stage 4 and creatinine appears to be at baseline    HTN (hypertension), benign  Assessment & Plan  · Monitor blood pressure    Dementia  Assessment & Plan  · Patient with advanced dementia and currently is a resident of a closed dementia unit in [facility] [place]  · Supportive care  · Patient was on hospice twice in the past but she improved and hospice was discontinued  · Family do not want any procedures        Mechanical VTE Prophylaxis in Place: No    Patient Centered Rounds: I have performed bedside rounds with nursing staff today  Time Spent for Care: 15 minutes  More than 50% of total time spent on counseling and coordination of care as described above      Current Length of Stay: 0 day(s)    Current Patient Status: Observation       Code Status: Level 3 - DNAR and DNI      Subjective:   No acute distress    Objective:     Vitals:   Temp (24hrs), Av 1 °F (36 7 °C), Min:97 6 °F (36 4 °C), Max:98 3 °F (36 8 °C)    Temp:  [97 6 °F (36 4 °C)-98 3 °F (36 8 °C)] 97 6 °F (36 4 °C)  HR:  [61-92] 72  Resp:  [15-20] 18  BP: (117-179)/(58-97) 117/58  SpO2:  [92 %-96 %] 92 %  Body mass index is 22 78 kg/m²  Input and Output Summary (last 24 hours): Intake/Output Summary (Last 24 hours) at 9/26/2019 1248  Last data filed at 9/26/2019 1243  Gross per 24 hour   Intake 300 ml   Output 396 ml   Net -96 ml       Physical Exam:     Physical Exam   HENT:   Head: Normocephalic and atraumatic  Cardiovascular: Exam reveals no friction rub  No murmur heard  Additional Data:     Labs:    Results from last 7 days   Lab Units 09/25/19  1016   WBC Thousand/uL 8 17   HEMOGLOBIN g/dL 12 7   HEMATOCRIT % 40 9   PLATELETS Thousands/uL 358   NEUTROS PCT % 65   LYMPHS PCT % 18   MONOS PCT % 10   EOS PCT % 6     Results from last 7 days   Lab Units 09/25/19  1016   POTASSIUM mmol/L 4 6   CHLORIDE mmol/L 109*   CO2 mmol/L 22   BUN mg/dL 40*   CREATININE mg/dL 2 26*   CALCIUM mg/dL 8 9           * I Have Reviewed All Lab Data Listed Above  * Additional Pertinent Lab Tests Reviewed:  All Labs Within Last 24 Hours Reviewed        Recent Cultures (last 7 days):           Last 24 Hours Medication List:     Current Facility-Administered Medications:  acetaminophen 650 mg Oral Q6H PRN Harrison Burns MD   aspirin 81 mg Oral Daily Harrison Burns MD   bisacodyl 10 mg Rectal Daily Harrison Burns MD   calcium carbonate 1,000 mg Oral Daily PRN Harrison Burns MD   haloperidol 0 5 mg Oral Q6H PRN Harrison Burns MD   hyoscyamine 0 125 mg Oral Q6H PRN Harrison Burns MD   lisinopril 2 5 mg Oral Daily Harrison Burns MD   loperamide 2 mg Oral 4x Daily PRN Harrison Burns MD   magnesium hydroxide 30 mL Oral Daily PRN Harrison Burns MD   metoprolol tartrate 25 mg Oral Q12H [address] Harrison Burns MD   ondansetron 4 mg Intravenous Q6H PRN Harrison Burns MD   prochlorperazine 10 mg Oral Q6H PRN Harrison Burns MD        Today, Patient Was Seen By: Darrin Faye DO    ** Please Note: Dictation voice to text software may have been used in the creation of this document   **

## 2019-09-26 NOTE — UTILIZATION REVIEW
Initial Clinical Review    Admission: Date/Time/Statement:    9/25/19 AT 1349 OBSERVATION  Orders Placed This Encounter   Procedures    Place in Observation     Standing Status:   Standing     Number of Occurrences:   1     Order Specific Question:   Admitting Physician     Answer:   Guillermo Eaton     Order Specific Question:   Level of Care     Answer:   Med Surg [16]     ED Arrival Information     Expected Arrival Acuity Means of Arrival Escorted By Service Admission Type    - 9/25/2019 09:48 Urgent Ambulance [facility] Hospitalist Urgent    Arrival Complaint    weakness     Chief Complaint   Patient presents with    Weakness - Generalized     Per EMS, pt was found on floor  Pt denies falling and wanted "to take a nap on the floor"  Pt sent from [facility]     Chief Complaint:   Found down on the floor     History of Present Illness:   Rajani Marion is a 80 y o  female who was sent to the emergency room after she was found down on the floor in the assisted living facility  Patient with advanced dementia and not able to provide any history  It appears that patient lives in the closed dementia unit in [address] [place] [address] and had multiple falls before  Today she was found on the floor  When asked what happened she reported that she wanted to take a nap on the floor  Patient not able to provide any history  Patient denies any chest pain  Patient denies any pain and she asked about leaving the hospital   Daughter-in-law in the room reported that patient had multiple falls recently and she is supposed to use a walker but the patient has not been using the walker since she does not like it  Daughter in law reported that patient refuses nail care  Patient has been on hospice in the past but was discontinued since she improved  Patient denied any pain but then she reported that last week she had a fall and has some pain on the  right hip but on detailed questioning she denies the pain again  Patient is very unreliable historian    Review of Systems:  Cannot be obtained due to her dementia    Assessment/Plan:   * Fall  Assessment & Plan  · Patient was found on the floor in the assisted living facility [facility] and was sent to the emergency room  Patient is not able to provide any detailed history  Family reported that patient had multiple falls in the past and she is supposed to use a walker but she does not like using the walker  · Imaging studies reviewed  · Questionable syncope-monitor on telemetry overnight, obtain echocardiogram-suspected cardiac murmur  Difficult to auscultate late since the patient keeps on talking during the auscultation,  Since patient had multiple falls this is likely  another  fall  · 4 Elevated BNP noted-will obtain an echocardiogram   No signs of fluid overload     CKD (chronic kidney disease) stage 4, GFR 15-29 ml/min ([facility])  Assessment & Plan  · Patient with CKD stage 4 and creatinine appears to be at baseline     HTN (hypertension), benign  Assessment & Plan  · Monitor blood pressure     Dementia  Assessment & Plan  · Patient with advanced dementia and currently is a resident of a closed dementia unit in [facility] [place]  · Supportive care  · Patient was on hospice twice in the past but she improved and hospice was discontinued  · Family do not want any procedures     VTE Prophylaxis: Pharmacologic VTE Prophylaxis contraindicated due to Multiple falls recently  / sequential compression device      Anticipated Length of Stay:  Patient will be admitted on an Observation basis with an anticipated length of stay of < 2  midnights       Justification for Hospital Stay:  Fall    ED Triage Vitals   Temperature Pulse Respirations Blood Pressure SpO2   09/25/19 1038 09/25/19 0955 09/25/19 0955 09/25/19 0955 09/25/19 0955   98 2 °F (36 8 °C) 76 19 130/65 100 %      Temp Source Heart Rate Source Patient Position - Orthostatic VS BP Location FiO2 (%)   09/25/19 1038 09/25/19 0955 09/25/19 0955 09/25/19 0955 --   Rectal Monitor Lying Left arm     Wt Readings from Last 1 Encounters:   09/25/19 62 1 kg (136 lb 14 5 oz)     Additional Vital Signs:   /19 0802  98 3 °F (36 8 °C)  64  16  153/77  95 %    09/26/19 0328  98 2 °F (36 8 °C)  90  18  132/77  96 %    09/26/19 0018        170/84      09/25/19 2315  98 2 °F (36 8 °C)  92  18  179/92Abnormal   96 %    09/25/19 1957  98 1 °F (36 7 °C)  82  18  170/90  93 %    09/25/19 1905              09/25/19 1500    77  20  174/69Abnormal   95 %      Pertinent Labs/Diagnostic Test Results:   Results from last 7 days   Lab Units 09/25/19  1016   WBC Thousand/uL 8 17   HEMOGLOBIN g/dL 12 7   HEMATOCRIT % 40 9   PLATELETS Thousands/uL 358   NEUTROS ABS Thousands/µL 5 30     Results from last 7 days   Lab Units 09/25/19  1016   SODIUM mmol/L 137   POTASSIUM mmol/L 4 6   CHLORIDE mmol/L 109*   CO2 mmol/L 22   ANION GAP mmol/L 6   BUN mg/dL 40*   CREATININE mg/dL 2 26*   EGFR ml/min/1 73sq m 19   CALCIUM mg/dL 8 9     Results from last 7 days   Lab Units 09/25/19  1016   GLUCOSE RANDOM mg/dL 123     Results from last 7 days   Lab Units 09/25/19  1016   CK TOTAL U/L 97     Results from last 7 days   Lab Units 09/25/19  2312 09/25/19  1947 09/25/19  1016   TROPONIN I ng/mL <0 02 <0 02 <0 02     Results from last 7 days   Lab Units 09/25/19  1016   NT-PRO BNP pg/mL 4,003*     CT BRAIN - WITHOUT CONTRAST -  No acute intracranial abnormality   Microangiopathic changes  CT CERVICAL SPINE - WITHOUT CONTRAST -  No cervical spine fracture or traumatic malalignment    CT CHEST, ABDOMEN AND PELVIS WITHOUT IV CONTRAST -  1   No acute traumatic injury to the chest, abdomen, or pelvis  2   Centrilobular emphysema with small lung nodules, one of which in the right lower lobe was not visualized previously, possibly obscured   If there is clinical concern, a 12 month follow-up CT is recommended  3   Cholelithiasis    4   Unchanged 3 8 cm infrarenal abdominal aortic aneurysm  5   Stable left adnexal cyst   If there is clinical concern, annual ultrasound follow-up is recommended  Past Medical History:   Diagnosis Date    A-fib ([address] )     CKD (chronic kidney disease)     COPD (chronic obstructive pulmonary disease) (HCC)     Dementia     Hypertension     Urinary tract infection     Uterine prolapse      Present on Admission:   Dementia   HTN (hypertension), benign    Admitting Diagnosis: Weakness [R53 1]  Injury, unspecified, initial encounter [T14 90XA]  Elevated brain natriuretic peptide (BNP) level [R79 89]    Age/Sex: 80 y o  female    Admission Orders: Up with Assistance  PT + OT EVALS  ECHO    Current Facility-Administered Medications:  acetaminophen 650 mg Oral Q6H PRN   aspirin 81 mg Oral Daily   bisacodyl 10 mg Rectal Daily   calcium carbonate 1,000 mg Oral Daily PRN   haloperidol 0 5 mg Oral Q6H PRN  GIVEN X 1   hyoscyamine 0 125 mg Oral Q6H PRN   lisinopril 2 5 mg Oral Daily   loperamide 2 mg Oral 4x Daily PRN   magnesium hydroxide 30 mL Oral Daily PRN   metoprolol tartrate 25 mg Oral Q12H [address]   ondansetron 4 mg Intravenous Q6H PRN   prochlorperazine 10 mg Oral Q6H PRN     Network Utilization Review Department  Phone: [phone]; Fax [phone]  [email or web address]  ATTENTION: Please call with any questions or concerns to [phone]  and carefully listen to the prompts so that you are directed to the right person  Send all requests for admission clinical reviews, approved or denied determinations and any other requests to fax [phone]   All voicemails are confidential

## 2019-09-26 NOTE — PLAN OF CARE
Problem: PHYSICAL THERAPY ADULT  Goal: Performs mobility at highest level of function for planned discharge setting  See evaluation for individualized goals  Description  Treatment/Interventions: Functional transfer training, LE strengthening/ROM, Therapeutic exercise, Endurance training, Patient/family training, Equipment eval/education, Bed mobility, Gait training, Spoke to nursing, Spoke to case management, OT  Equipment Recommended: Hassan Shone       See flowsheet documentation for full assessment, interventions and recommendations  Note:   Prognosis: Fair  Problem List: Decreased strength, Decreased endurance, Impaired balance, Decreased mobility, Decreased cognition, Impaired judgement, Decreased safety awareness, Decreased skin integrity  Assessment: pt is a 81 y/o female admitted to [facility] 2* weakness,h/o falls  pt lives at [facility] locked dementia unit with use of RW for mobility  Per pt's documentation, pt chooses not to use RW and  has had multiple falls  Pt currently is not at functional mobility baseline,needs Ax1 for mobility with use of RW,multiple lines,chair alarm use,h/o falls,masimo monitoring,ataxic and unsteady gait pattern and dec cognition  pt demonstrates moderate deficits during functional mobility and gait including dec endurance,dec BLE strength,ataxic and unsteady gait pattern,dec balance,dec cognition (baseline?) and needs modAx1 for transfers and gait with use of RW  Pt would cont to benefit from skilled inpt PT services to maximize functional independence  Recommendation: Other (Comment)(inpt rhb vs return to previous environment PENDING mobility)          See flowsheet documentation for full assessment

## 2019-09-26 NOTE — PLAN OF CARE
Problem: OCCUPATIONAL THERAPY ADULT  Goal: Performs self-care activities at highest level of function for planned discharge setting  See evaluation for individualized goals  Description  Treatment Interventions: ADL retraining, Functional transfer training, UE strengthening/ROM, Endurance training, Cognitive reorientation, Patient/family training, Equipment evaluation/education, Compensatory technique education, Activityengagement, Energy conservation          See flowsheet documentation for full assessment, interventions and recommendations  Note:   Limitation: Decreased ADL status, Decreased UE ROM, Decreased UE strength, Decreased Safe judgement during ADL, Decreased cognition, Decreased endurance, Decreased high-level ADLs  Prognosis: Fair  Assessment: Pt is a 81 yo female seen for OT eval s/p adm to [facility] w/ s/p fall on floor in [facility] dx'd w/ falls  Comorbidities include a h/o dementia, HTN, CKD stage 4, TRUNG, COPD, brain mass, A-fib, urinary retention, uterine prolapse, acute cystitis w/o heaturia, recurrent epistaxis  Pt with active OT orders and up with assistance  orders  Pt presents confused and is a poor historian - all home setup and PLOF obtained via CM report  Per CM, Pt resides at [facility] unit assisted living  Per EMR, Pt is supposed to ambulate w/ RW, however chooses not to and has had multiple recent falls  Unknown level of A w/ self care PTA  Pt is currently demonstrating the following occupational deficits: Mod A UB bathing/dressing, Max A LB bathing/dressing, Mod A toileting, Min Ax2 functional transfers and mobility w/ HHAx2   These deficits that are impacting pt's baseline areas of occupation are a result of the following impairments: endurance, activity tolerance, functional mobility, forward functional reach, balance, functional standing tolerance, unsupportive home environment, decreased I w/ ADLS/IADLS, strength, cognitive impairments, decreased safety awareness, decreased insight into deficits and impaired fine motor skills  The following Occupational Performance Areas to address include: grooming, bathing/shower, toilet hygiene, dressing, health maintenance, functional mobility and clothing management  Pt scored overall 35/100 on the Barthel Index  Based on the aforementioned OT evaluation, functional performance deficits, and assessments, pt has been identified as a high complexity evaluation  Recommend STR or return to facility if facility staff is able to provide increased S and A upon D/C  Pt to continue to benefit from acute immediate OT services to address the following goals 3-5x/week to  w/in 7-10 days:      OT Discharge Recommendation: Short Term Rehab(or return to facility if able to provide increased A)  OT - OK to Discharge: Yes(when medically cleared)       Problem: OCCUPATIONAL THERAPY ADULT  Goal: Performs self-care activities at highest level of function for planned discharge setting  See evaluation for individualized goals  Description  Treatment Interventions: ADL retraining, Functional transfer training, UE strengthening/ROM, Endurance training, Cognitive reorientation, Patient/family training, Equipment evaluation/education, Compensatory technique education, Activityengagement, Energy conservation          See flowsheet documentation for full assessment, interventions and recommendations  Note:   Limitation: Decreased ADL status, Decreased UE ROM, Decreased UE strength, Decreased Safe judgement during ADL, Decreased cognition, Decreased endurance, Decreased high-level ADLs  Prognosis: Fair  Assessment: Pt is a 81 yo female seen for OT eval s/p adm to [facility] w/ s/p fall on floor in custodial dx'd w/ falls  Comorbidities include a h/o dementia, HTN, CKD stage 4, TRUNG, COPD, brain mass, A-fib, urinary retention, uterine prolapse, acute cystitis w/o heaturia, recurrent epistaxis  Pt with active OT orders and up with assistance  orders    Pt presents confused and is a poor historian - all home setup and PLOF obtained via CM report  Per CM, Pt resides at [facility] unit assisted living  Per EMR, Pt is supposed to ambulate w/ RW, however chooses not to and has had multiple recent falls  Unknown level of A w/ self care PTA  Pt is currently demonstrating the following occupational deficits: Mod A UB bathing/dressing, Max A LB bathing/dressing, Mod A toileting, Min Ax2 functional transfers and mobility w/ HHAx2  These deficits that are impacting pt's baseline areas of occupation are a result of the following impairments: endurance, activity tolerance, functional mobility, forward functional reach, balance, functional standing tolerance, unsupportive home environment, decreased I w/ ADLS/IADLS, strength, cognitive impairments, decreased safety awareness, decreased insight into deficits and impaired fine motor skills  The following Occupational Performance Areas to address include: grooming, bathing/shower, toilet hygiene, dressing, health maintenance, functional mobility and clothing management  Pt scored overall 35/100 on the Barthel Index  Based on the aforementioned OT evaluation, functional performance deficits, and assessments, pt has been identified as a high complexity evaluation  Recommend STR or return to facility if facility staff is able to provide increased S and A upon D/C   Pt to continue to benefit from acute immediate OT services to address the following goals 3-5x/week to  w/in 7-10 days:      OT Discharge Recommendation: Short Term Rehab(or return to facility if able to provide increased A)  OT - OK to Discharge: Yes(when medically cleared)

## 2019-09-27 PROCEDURE — 97116 GAIT TRAINING THERAPY: CPT

## 2019-09-27 PROCEDURE — 99225 PR SBSQ OBSERVATION CARE/DAY 25 MINUTES: CPT | Performed by: INTERNAL MEDICINE

## 2019-09-27 PROCEDURE — 97530 THERAPEUTIC ACTIVITIES: CPT

## 2019-09-27 RX ADMIN — BISACODYL 10 MG: 10 SUPPOSITORY RECTAL at 08:24

## 2019-09-27 RX ADMIN — LISINOPRIL 2.5 MG: 2.5 TABLET ORAL at 08:23

## 2019-09-27 RX ADMIN — ASPIRIN 81 MG: 81 TABLET, COATED ORAL at 08:23

## 2019-09-27 RX ADMIN — METOPROLOL TARTRATE 25 MG: 25 TABLET, FILM COATED ORAL at 21:42

## 2019-09-27 RX ADMIN — METOPROLOL TARTRATE 25 MG: 25 TABLET, FILM COATED ORAL at 08:23

## 2019-09-27 NOTE — PHYSICIAN ADVISOR
Based on the current documentation, I do not see indication for change to inpatient class  The patient does not want aggressive care  Her renal function is no longer being monitored (as far as ordering repeat labs)  She is not on intravenous medication or intravenous fluids  Diagnostic testing has not been ordered  Her son has requested hospice evaluation  If the case is reassessed later and there is any change in the care plan that would involve acute hospital management, then I recommend change to inpatient class at that time

## 2019-09-27 NOTE — PLAN OF CARE
Problem: PHYSICAL THERAPY ADULT  Goal: Performs mobility at highest level of function for planned discharge setting  See evaluation for individualized goals  Description  Treatment/Interventions: Functional transfer training, LE strengthening/ROM, Therapeutic exercise, Endurance training, Patient/family training, Equipment eval/education, Bed mobility, Gait training, Spoke to nursing, Spoke to case management, OT  Equipment Recommended: Rachel Redder       See flowsheet documentation for full assessment, interventions and recommendations  Outcome: Progressing  Note:   Prognosis: Fair  Problem List: Decreased strength, Decreased endurance, Impaired balance, Decreased mobility, Decreased cognition, Impaired judgement, Decreased safety awareness, Decreased skin integrity  Assessment: The pt  was found getting out of the chair without assistance with the alarm sounding  She was able to ambulate a short distance, but she declined use of the walker or walking in the hallway  She requested male staff to leave while she went to the bathroom  She continues to remain limited from her baseline, and she will benefit from continued physical therapy to maximize her functional mobility and safety  Recommendation: Other (Comment)(Rehab vs  return to facility )          See flowsheet documentation for full assessment

## 2019-09-27 NOTE — UTILIZATION REVIEW
Continued Stay Review    Date: 9/27/19 Friday        Current Patient Class: OBSERVATION  Current Level of Care: MED SURG    HPI:   80 y  o  female to [facility] ED + placed in OBSERVATION 9/25/19 AT 1349 after found down on the floor in Dementia unit of her assisted living facility/ [facility]  Imaging studies negative for acute injury  CURRENT ASSESSMENT/ PLAN PER RECENT INT MED NOTE:  Fall  Assessment & Plan  · Patient was found on the floor in the assisted living facility [facility] and was sent to the emergency room  Patient is not able to provide any detailed history  Family reported that patient had multiple falls in the past and she is supposed to use a walker but she does not like using the walker  · Imaging studies reviewed  · Questionable syncope-monitor on telemetry overnight, obtain echocardiogram-suspected cardiac murmur  Difficult to auscultate late since the patient keeps on talking during the auscultation,  Since patient had multiple falls this is likely  another  fall  · 4 Elevated BNP noted-will obtain an echocardiogram   No signs of fluid overload  · Case management for placement     Dementia  Assessment & Plan  · Patient with advanced dementia and currently is a resident of a closed dementia unit in [facility] AT [place]  · Supportive care  · Patient was on hospice twice in the past but she improved and hospice was discontinued    · Family do not want any procedures      Mechanical VTE Prophylaxis in Place: No      Pertinent Labs/Diagnostic Results:   Results from last 7 days   Lab Units 09/25/19  1016   WBC Thousand/uL 8 17   HEMOGLOBIN g/dL 12 7   HEMATOCRIT % 40 9   PLATELETS Thousands/uL 358   NEUTROS ABS Thousands/µL 5 30       Results from last 7 days   Lab Units 09/25/19  1016   SODIUM mmol/L 137   POTASSIUM mmol/L 4 6   CHLORIDE mmol/L 109*   CO2 mmol/L 22   ANION GAP mmol/L 6   BUN mg/dL 40*   CREATININE mg/dL 2 26*   EGFR ml/min/1 73sq m 19   CALCIUM mg/dL 8 9     Results from last 7 days   Lab Units 09/25/19  1016   GLUCOSE RANDOM mg/dL 123     Results from last 7 days   Lab Units 09/25/19  1016   CK TOTAL U/L 97     Results from last 7 days   Lab Units 09/25/19  2312 09/25/19  1947 09/25/19  1016   TROPONIN I ng/mL <0 02 <0 02 <0 02     Results from last 7 days   Lab Units 09/25/19  1016   NT-PRO BNP pg/mL 4,003*     Vital Signs:   /19 0700  97 8 °F (36 6 °C)  65  18  140/70  95 %    09/27/19 0251  97 9 °F (36 6 °C)  66  18  163/91  96 %    09/26/19 2310  97 9 °F (36 6 °C)  56  18  133/64  97 %    09/26/19 1938  97 5 °F (36 4 °C)  72  18  140/80  95 %    09/26/19 1920              09/26/19 1100  97 6 °F (36 4 °C)  72  18  117/58  92 %      I/O   Urine (mL/kg/hr) 0 846 (0 6)    Total Output 0 846    Net 0 -546          Unmeasured Urine Occurrence 1 x       Regular House Diet    Dietary nutrition supplements Ensure Enlive Daily    Scheduled Meds:   Current Facility-Administered Medications:  acetaminophen 650 mg Oral Q6H PRN   aspirin 81 mg Oral Daily   bisacodyl 10 mg Rectal Daily   calcium carbonate 1,000 mg Oral Daily PRN   haloperidol 0 5 mg Oral Q6H PRN   hyoscyamine 0 125 mg Oral Q6H PRN   lisinopril 2 5 mg Oral Daily   loperamide 2 mg Oral 4x Daily PRN   magnesium hydroxide 30 mL Oral Daily PRN   metoprolol tartrate 25 mg Oral Q12H MARJORIE   ondansetron 4 mg Intravenous Q6H PRN   prochlorperazine 10 mg Oral Q6H PRN     Discharge Plan:   ANTICIPATE DISCHARGE BACK TO correction/ DEMENTIA UNIT AT [facility] - WHEN MEDICALLY CLEARED  CASE MANAGEMENT FOLLOWING CLOSELY FOR ALL DISCHARGE NEEDS    Network Utilization Review Department  Phone: [phone]; Fax [phone]  [email or web address]  ATTENTION: Please call with any questions or concerns to [phone]  and carefully listen to the prompts so that you are directed to the right person  Send all requests for admission clinical reviews, approved or denied determinations and any other requests to fax [phone]   All voicemails are confidential

## 2019-09-27 NOTE — PHYSICAL THERAPY NOTE
Physical Therapy Progress Note     09/27/19 1300   Pain Assessment   Pain Assessment No/denies pain   Pain Score No Pain   Restrictions/Precautions   Other Precautions Cognitive; Chair Alarm; Bed Alarm; Fall Risk  (Alarm active post session )   Subjective   Subjective The pt  wants to return to bed  Bed Mobility   Sit to Supine 4  Minimal assistance   Additional items Assist x 1; Increased time required;Verbal cues;LE management   Transfers   Sit to Stand 4  Minimal assistance   Additional items Assist x 1;Verbal cues   Stand to Sit 4  Minimal assistance   Additional items Assist x 1;Verbal cues   Ambulation/Elevation   Gait pattern Excessively slow; Step to;Short stride; Inconsistent altaf;Decreased foot clearance; Forward Flexion;Narrow ANDRÉS   Gait Assistance 4  Minimal assist   Additional items Assist x 1;Verbal cues   Assistive Device Other (Comment)  (Hand-hold assist )   Distance 15 feet  Balance   Static Sitting Good   Dynamic Sitting Fair +   Static Standing Poor +   Ambulatory Poor +   Activity Tolerance   Activity Tolerance Patient tolerated treatment well;Patient limited by fatigue   Exercises   Ankle Pumps Supine;Bilateral;AROM;10 reps   Assessment   Prognosis Fair   Problem List Decreased strength;Decreased endurance; Impaired balance;Decreased mobility; Decreased cognition; Impaired judgement;Decreased safety awareness;Decreased skin integrity   Assessment The pt  was found getting out of the chair without assistance with the alarm sounding  She was able to ambulate a short distance, but she declined use of the walker or walking in the hallway  She requested male staff to leave while she went to the bathroom  She continues to remain limited from her baseline, and she will benefit from continued physical therapy to maximize her functional mobility and safety  Goals   Patient Goals To go back to bed     STG Expiration Date 10/06/19   PT Treatment Day 2   Plan   Treatment/Interventions Functional transfer training;LE strengthening/ROM; Therapeutic exercise; Endurance training;Patient/family training;Bed mobility;Gait training   Progress Slow progress, decreased activity tolerance   PT Frequency   (3-5x a week )   Recommendation   Recommendation Other (Comment)  (Rehab vs  return to facility )   Equipment Recommended Jose Jernigan PTA

## 2019-09-27 NOTE — PLAN OF CARE
Problem: Potential for Falls  Goal: Patient will remain free of falls  Description  INTERVENTIONS:  - Assess patient frequently for physical needs  -  Identify cognitive and physical deficits and behaviors that affect risk of falls  -  [place] fall precautions as indicated by assessment   - Educate patient/family on patient safety including physical limitations  - Instruct patient to call for assistance with activity based on assessment  - Modify environment to reduce risk of injury  - Consider OT/PT consult to assist with strengthening/mobility  Outcome: Progressing     Problem: Prexisting or High Potential for Compromised Skin Integrity  Goal: Skin integrity is maintained or improved  Description  INTERVENTIONS:  - Identify patients at risk for skin breakdown  - Assess and monitor skin integrity  - Assess and monitor nutrition and hydration status  - Monitor labs   - Assess for incontinence   - Turn and reposition patient  - Assist with mobility/ambulation  - Relieve pressure over bony prominences  - Avoid friction and shearing  - Provide appropriate hygiene as needed including keeping skin clean and dry  - Evaluate need for skin moisturizer/barrier cream  - Collaborate with interdisciplinary team   - Patient/family teaching  - Consider wound care consult   Outcome: Progressing     Problem: Nutrition/Hydration-ADULT  Goal: Nutrient/Hydration intake appropriate for improving, restoring or maintaining nutritional needs  Description  Monitor and assess patient's nutrition/hydration status for malnutrition  Collaborate with interdisciplinary team and initiate plan and interventions as ordered  Monitor patient's weight and dietary intake as ordered or per policy  Utilize nutrition screening tool and intervene as necessary  Determine patient's food preferences and provide high-protein, high-caloric foods as appropriate       INTERVENTIONS:  - Monitor oral intake, urinary output, labs, and treatment plans  - Assess nutrition and hydration status and recommend course of action  - Evaluate amount of meals eaten  - Assist patient with eating if necessary   - Allow adequate time for meals  - Recommend/ encourage appropriate diets, oral nutritional supplements, and vitamin/mineral supplements  - Order, calculate, and assess calorie counts as needed  - Recommend, monitor, and adjust tube feedings and TPN/PPN based on assessed needs  - Assess need for intravenous fluids  - Provide specific nutrition/hydration education as appropriate  - Include patient/family/caregiver in decisions related to nutrition  Outcome: Progressing     Problem: CARDIOVASCULAR - ADULT  Goal: Maintains optimal cardiac output and hemodynamic stability  Description  INTERVENTIONS:  - Monitor I/O, vital signs and rhythm  - Monitor for S/S and trends of decreased cardiac output  - Administer and titrate ordered vasoactive medications to optimize hemodynamic stability  - Assess quality of pulses, skin color and temperature  - Assess for signs of decreased coronary artery perfusion  - Instruct patient to report change in severity of symptoms  Outcome: Progressing  Goal: Absence of cardiac dysrhythmias or at baseline rhythm  Description  INTERVENTIONS:  - Continuous cardiac monitoring, vital signs, obtain 12 lead EKG if ordered  - Administer antiarrhythmic and heart rate control medications as ordered  - Monitor electrolytes and administer replacement therapy as ordered  Outcome: Progressing     Problem: SKIN/TISSUE INTEGRITY - ADULT  Goal: Skin integrity remains intact  Description  INTERVENTIONS  - Identify patients at risk for skin breakdown  - Assess and monitor skin integrity  - Assess and monitor nutrition and hydration status  - Monitor labs (i e  albumin)  - Assess for incontinence   - Turn and reposition patient  - Assist with mobility/ambulation  - Relieve pressure over bony prominences  - Avoid friction and shearing  - Provide appropriate hygiene as needed including keeping skin clean and dry  - Evaluate need for skin moisturizer/barrier cream  - Collaborate with interdisciplinary team (i e  Nutrition, Rehabilitation, etc )   - Patient/family teaching  Outcome: Progressing

## 2019-09-27 NOTE — SOCIAL WORK
DEYA spoke with Etienne at [facility] in regards to pt d/c planning and return to facility  Pt is medically stable for d/c  CM faxed new medical eval and PT/OT notes as requested  Verbally reviewed today's PT notes showing pt was an assist x1  Hefsy states pt able to return to facility on same floor  Hefsy requested transfer tomorrow as this late in the day there is not staff available to assist with readmission  DEYA arranged [address] transport with [facility] for 2pm on 09/28/19  Notified pt and family of d/c plans     Notified son Lianet Gee of cost for [employer], he is agreeable to cost

## 2019-09-27 NOTE — ASSESSMENT & PLAN NOTE
· Patient with advanced dementia and currently is a resident of a closed dementia unit in [facility] AT [place]  · Supportive care  · Patient was on hospice twice in the past but she improved and hospice was discontinued    · Family do not want any procedures

## 2019-09-27 NOTE — ASSESSMENT & PLAN NOTE
· Patient was found on the floor in the assisted living facility [facility] and was sent to the emergency room  Patient is not able to provide any detailed history  Family reported that patient had multiple falls in the past and she is supposed to use a walker but she does not like using the walker  · Imaging studies reviewed  · Questionable syncope-monitor on telemetry overnight, obtain echocardiogram-suspected cardiac murmur  Difficult to auscultate late since the patient keeps on talking during the auscultation,    Since patient had multiple falls this is likely  another  fall  · 4 Elevated BNP noted-will obtain an echocardiogram   No signs of fluid overload  · Case management for placement  · Son requesting hospice evaluation given patient's worsening renal function in conjunction with failure to thrive and advanced dementia

## 2019-09-27 NOTE — PROGRESS NOTES
Progress Note - Robson Montes De [place] 1929, 80 y o  female MRN: [id number]    Unit/Bed#: [facility] 530-01 Encounter: [id number]    Primary Care Provider: Michele Gilliam DO   Date and time admitted to hospital: 2019  9:48 AM        * [address]  · Patient was found on the floor in the assisted living facility [facility] and was sent to the emergency room  Patient is not able to provide any detailed history  Family reported that patient had multiple falls in the past and she is supposed to use a walker but she does not like using the walker  · Imaging studies reviewed  · Questionable syncope-monitor on telemetry overnight, obtain echocardiogram-suspected cardiac murmur  Difficult to auscultate late since the patient keeps on talking during the auscultation,  Since patient had multiple falls this is likely  another  fall  · 4 Elevated BNP noted-will obtain an echocardiogram   No signs of fluid overload  · Case management for placement  · Son requesting hospice evaluation given patient's worsening renal function in conjunction with failure to thrive and advanced dementia    CKD (chronic kidney disease) stage 4, GFR 15-29 ml/min ([facility])  Assessment & Plan  · Patient with CKD stage 4 and creatinine appears to be at baseline    HTN (hypertension), benign  Assessment & Plan  · Monitor blood pressure    Dementia  Assessment & Plan  · Patient with advanced dementia and currently is a resident of a closed dementia unit in [facility] AT [place]  · Supportive care  · Patient was on hospice twice in the past but she improved and hospice was discontinued  · Family do not want any procedures        VTE Pharmacologic Prophylaxis:   Pharmacologic:     Discharge Plan:  Await placement  Case management following      Code Status: Level 3 - DNAR and DNI      Subjective:   Patient comfortable    Objective:     Vitals:   Temp (24hrs), Av 8 °F (36 6 °C), Min:97 5 °F (36 4 °C), Max:97 9 °F (36 6 °C)    Temp:  [97 5 °F (36 4 °C)-97 9 °F (36 6 °C)] 97 8 °F (36 6 °C)  HR:  [56-72] 65  Resp:  [18] 18  BP: (133-163)/(64-91) 140/70  SpO2:  [95 %-97 %] 95 %  Body mass index is 22 78 kg/m²  Input and Output Summary (last 24 hours): Intake/Output Summary (Last 24 hours) at 9/27/2019 1116  Last data filed at 9/27/2019 0022  Gross per 24 hour   Intake 180 ml   Output 450 ml   Net -270 ml       Physical Exam:     Physical Exam   HENT:   Head: Normocephalic and atraumatic  Eyes: Pupils are equal, round, and reactive to light  EOM are normal    Pulmonary/Chest: Breath sounds normal  No respiratory distress  Additional Data:     Labs:    Results from last 7 days   Lab Units 09/25/19  1016   WBC Thousand/uL 8 17   HEMOGLOBIN g/dL 12 7   HEMATOCRIT % 40 9   PLATELETS Thousands/uL 358   NEUTROS PCT % 65   LYMPHS PCT % 18   MONOS PCT % 10   EOS PCT % 6     Results from last 7 days   Lab Units 09/25/19  1016   POTASSIUM mmol/L 4 6   CHLORIDE mmol/L 109*   CO2 mmol/L 22   BUN mg/dL 40*   CREATININE mg/dL 2 26*   CALCIUM mg/dL 8 9           * I Have Reviewed All Lab Data Listed Above  * Additional Pertinent Lab Tests Reviewed:  All Labs Within Last 24 Hours Reviewed      Recent Cultures (last 7 days):           Last 24 Hours Medication List:     Current Facility-Administered Medications:  acetaminophen 650 mg Oral Q6H PRN Cameron Renee MD   aspirin 81 mg Oral Daily Cameron Renee MD   bisacodyl 10 mg Rectal Daily Cameron Renee MD   calcium carbonate 1,000 mg Oral Daily PRN Cameron Renee MD   haloperidol 0 5 mg Oral Q6H PRN Cameron Renee MD   hyoscyamine 0 125 mg Oral Q6H PRN Cameron Renee MD   lisinopril 2 5 mg Oral Daily Cameron Renee MD   loperamide 2 mg Oral 4x Daily PRN Cameron Renee MD   magnesium hydroxide 30 mL Oral Daily PRN Cameron Renee MD   metoprolol tartrate 25 mg Oral Q12H Sabine Connell MD   ondansetron 4 mg Intravenous Q6H PRN Cameron Renee MD   prochlorperazine 10 mg Oral Q6H PRN Cameron Renee MD        Today, Patient Was Seen By: Yadiel Graham, DO    ** Please Note: Dictation voice to text software may have been used in the creation of this document   **

## 2019-09-28 VITALS
HEART RATE: 60 BPM | DIASTOLIC BLOOD PRESSURE: 80 MMHG | RESPIRATION RATE: 16 BRPM | OXYGEN SATURATION: 95 % | BODY MASS INDEX: 22.78 KG/M2 | WEIGHT: 136.91 LBS | TEMPERATURE: 98.1 F | SYSTOLIC BLOOD PRESSURE: 155 MMHG

## 2019-09-28 PROCEDURE — 99217 PR OBSERVATION CARE DISCHARGE MANAGEMENT: CPT | Performed by: INTERNAL MEDICINE

## 2019-09-28 RX ADMIN — LISINOPRIL 2.5 MG: 2.5 TABLET ORAL at 09:01

## 2019-09-28 RX ADMIN — ASPIRIN 81 MG: 81 TABLET, COATED ORAL at 09:01

## 2019-09-28 RX ADMIN — METOPROLOL TARTRATE 25 MG: 25 TABLET, FILM COATED ORAL at 09:01

## 2019-09-28 NOTE — SOCIAL WORK
Call placed to [facility] to inform them of transport time of 2pm    Met with patient and son, Bert Chew, to discuss discharge

## 2019-09-28 NOTE — SOCIAL WORK
Call received from [facility] that they will be delayed in picking up the patient until 3:45-4:00  Informed RN and [facility]  Called son, Dominique Carr [address], to inform the family  Malissa Ospina called and now can do 2pm transport  Informed RN, CORINA and left message for son, Dominique Carr [phone]

## 2019-09-28 NOTE — ASSESSMENT & PLAN NOTE
· Patient with advanced dementia and currently is a resident of a closed dementia unit in [facility] AT [place]  · Supportive care  · Patient was on hospice twice in the past but she improved and hospice was discontinued    · Family do not want any procedures

## 2019-09-28 NOTE — DISCHARGE SUMMARY
Discharge- Anthony Jump 6/27/1929, 80 y o  female MRN: [id number]    Unit/Bed#: [facility] 530-01 Encounter: [id number]    Primary Care Provider: Randolph Malhotra DO   Date and time admitted to hospital: 9/25/2019  9:48 AM        * [address]  · Patient was found on the floor in the assisted living facility [facility] and was sent to the emergency room  Patient is not able to provide any detailed history  Family reported that patient had multiple falls in the past and she is supposed to use a walker but she does not like using the walker  · Imaging studies reviewed  · Questionable syncope-monitor on telemetry overnight, obtain echocardiogram-suspected cardiac murmur  Difficult to auscultate late since the patient keeps on talking during the auscultation,  Since patient had multiple falls this is likely  another  fall  · 4 Elevated BNP noted-will obtain an echocardiogram   No signs of fluid overload  · Case management for placement  · Plan will be to discharge back to [facility] with services  CKD (chronic kidney disease) stage 4, GFR 15-29 ml/min ([facility])  Assessment & Plan  · Patient with CKD stage 4 and creatinine appears to be at baseline    HTN (hypertension), benign  Assessment & Plan  · Monitor blood pressure    Dementia  Assessment & Plan  · Patient with advanced dementia and currently is a resident of a closed dementia unit in [facility] AT [place]  · Supportive care  · Patient was on hospice twice in the past but she improved and hospice was discontinued    · Family do not want any procedures                Resolved Problems  Date Reviewed: 9/25/2019    None          Admission Date:   Admission Orders (From admission, onward)     Ordered        09/25/19 1349  Place in Observation  Once                     Admitting Diagnosis: Weakness [R53 1]  Injury, unspecified, initial encounter [T14 90XA]  Elevated brain natriuretic peptide (BNP) level [R79 89]        Procedures Performed:   Orders Placed This Encounter   Procedures    ED ECG Documentation Only       Summary of Hospital Course: This is a very pleasant 49-year-old female presents emergency room after being found down on the floor  She was lives at the assisted living facility was found with will be getting up  She has a known history of advanced dementia and is in a [address]  Patient presents with multiple falls by history  After further discussion with family and facility patient is to be discharged back to the assisted care facility  Family did express interest in hospice services at the facility  They will initiate services there if her condition were to decline  Family not interested in aggressive escalation of care  Condition at Discharge: fair         Discharge instructions/Information to patient and family:   See after visit summary for information provided to patient and family  Provisions for Follow-Up Care:  See after visit summary for information related to follow-up care and any pertinent home health orders  PCP: Mason Perez DO    Disposition: Home    Planned Readmission: No    Discharge Statement   I spent 35 minutes discharging the patient  This time was spent on the day of discharge  I had direct contact with the patient on the day of discharge  Additional documentation is required if more than 30 minutes were spent on discharge  Discharge Medications:  See after visit summary for reconciled discharge medications provided to patient and family

## 2019-09-28 NOTE — ASSESSMENT & PLAN NOTE
· Patient was found on the floor in the assisted living facility [facility] and was sent to the emergency room  Patient is not able to provide any detailed history  Family reported that patient had multiple falls in the past and she is supposed to use a walker but she does not like using the walker  · Imaging studies reviewed  · Questionable syncope-monitor on telemetry overnight, obtain echocardiogram-suspected cardiac murmur  Difficult to auscultate late since the patient keeps on talking during the auscultation,  Since patient had multiple falls this is likely  another  fall  · 4 Elevated BNP noted-will obtain an echocardiogram   No signs of fluid overload  · Case management for placement  · Plan will be to discharge back to [facility] with services

## 2020-02-17 ENCOUNTER — TELEPHONE (OUTPATIENT)
Dept: OBGYN CLINIC | Facility: CLINIC | Age: 85
End: 2020-02-17

## 2020-02-17 ENCOUNTER — TELEPHONE (OUTPATIENT)
Dept: UROLOGY | Facility: MEDICAL CENTER | Age: 85
End: 2020-02-17

## 2020-02-17 NOTE — TELEPHONE ENCOUNTER
Daughter in law (yvonne called)  Had pessary put in by you at [facility]  Hospice set that up  Has appt with urology to check the pessary, wants to know if she should come here instead and what time frame  She has no issues at this time   Barbara Hernandez [phone]

## 2020-02-17 NOTE — TELEPHONE ENCOUNTER
Daughter in Lucy called back  She does not need a call I n regard to the passary  She reported that this can wait until a later time    Thank you

## 2020-02-17 NOTE — TELEPHONE ENCOUNTER
Patient has upcoming appointment with Donya Cagle and daughter in law would like to know if this appointment includes follow up care for pessry check  Daughter states pessary was put in while patient was in hospice care and device does need to be checked  Please advise if this is reason why patient is coming into office

## 2020-02-18 NOTE — TELEPHONE ENCOUNTER
It was a follow up that was scheduled at urology but they weren't sure what for, note in chart  There is no issue with the pessary at this time, daughter in law was just checking

## 2020-02-18 NOTE — TELEPHONE ENCOUNTER
No issues with pessary at this time, wants to know when it should be cleaned, would like to wait for nicer weather

## 2020-02-18 NOTE — TELEPHONE ENCOUNTER
She can come when the weather is better  If she is not having discharge or bleeding, we don't necessarily need to do anything  Or she can come once a year for a pessary check in the summer

## 2020-07-18 ENCOUNTER — HOSPITAL ENCOUNTER (EMERGENCY)
Facility: HOSPITAL | Age: 85
Discharge: HOME | End: 2020-07-18
Attending: EMERGENCY MEDICINE | Admitting: EMERGENCY MEDICINE
Payer: MEDICARE

## 2020-07-18 ENCOUNTER — APPOINTMENT (EMERGENCY)
Dept: RADIOLOGY | Facility: HOSPITAL | Age: 85
End: 2020-07-18
Payer: MEDICARE

## 2020-07-18 VITALS
RESPIRATION RATE: 20 BRPM | WEIGHT: 150 LBS | SYSTOLIC BLOOD PRESSURE: 190 MMHG | BODY MASS INDEX: 24.96 KG/M2 | OXYGEN SATURATION: 99 % | DIASTOLIC BLOOD PRESSURE: 87 MMHG | HEART RATE: 92 BPM

## 2020-07-18 DIAGNOSIS — J18.9 PNEUMONIA: ICD-10-CM

## 2020-07-18 DIAGNOSIS — R53.83 LOW ENERGY: Primary | ICD-10-CM

## 2020-07-18 LAB
ALBUMIN SERPL BCP-MCNC: 3.2 G/DL (ref 3.5–5)
ALP SERPL-CCNC: 76 U/L (ref 46–116)
ALT SERPL W P-5'-P-CCNC: 20 U/L (ref 12–78)
ANION GAP SERPL CALCULATED.3IONS-SCNC: 7 MMOL/L (ref 4–13)
APTT PPP: 31 SECONDS (ref 23–37)
AST SERPL W P-5'-P-CCNC: 27 U/L (ref 5–45)
BASOPHILS # BLD AUTO: 0.02 THOUSANDS/ΜL (ref 0–0.1)
BASOPHILS NFR BLD AUTO: 0 % (ref 0–1)
BILIRUB SERPL-MCNC: 0.35 MG/DL (ref 0.2–1)
BUN SERPL-MCNC: 28 MG/DL (ref 5–25)
CALCIUM SERPL-MCNC: 8.3 MG/DL (ref 8.3–10.1)
CHLORIDE SERPL-SCNC: 103 MMOL/L (ref 100–108)
CO2 SERPL-SCNC: 23 MMOL/L (ref 21–32)
CREAT SERPL-MCNC: 1.72 MG/DL (ref 0.6–1.3)
EOSINOPHIL # BLD AUTO: 0.02 THOUSAND/ΜL (ref 0–0.61)
EOSINOPHIL NFR BLD AUTO: 0 % (ref 0–6)
ERYTHROCYTE [DISTWIDTH] IN BLOOD BY AUTOMATED COUNT: 13.6 % (ref 11.6–15.1)
GFR SERPL CREATININE-BSD FRML MDRD: 26 ML/MIN/1.73SQ M
GLUCOSE SERPL-MCNC: 147 MG/DL (ref 65–140)
HCT VFR BLD AUTO: 42.3 % (ref 34.8–46.1)
HGB BLD-MCNC: 13.5 G/DL (ref 11.5–15.4)
IMM GRANULOCYTES # BLD AUTO: 0.02 THOUSAND/UL (ref 0–0.2)
IMM GRANULOCYTES NFR BLD AUTO: 0 % (ref 0–2)
INR PPP: 1 (ref 0.84–1.19)
LACTATE SERPL-SCNC: 1.9 MMOL/L (ref 0.5–2)
LYMPHOCYTES # BLD AUTO: 0.62 THOUSANDS/ΜL (ref 0.6–4.47)
LYMPHOCYTES NFR BLD AUTO: 11 % (ref 14–44)
MCH RBC QN AUTO: 30.5 PG (ref 26.8–34.3)
MCHC RBC AUTO-ENTMCNC: 31.9 G/DL (ref 31.4–37.4)
MCV RBC AUTO: 96 FL (ref 82–98)
MONOCYTES # BLD AUTO: 1.21 THOUSAND/ΜL (ref 0.17–1.22)
MONOCYTES NFR BLD AUTO: 21 % (ref 4–12)
NEUTROPHILS # BLD AUTO: 3.86 THOUSANDS/ΜL (ref 1.85–7.62)
NEUTS SEG NFR BLD AUTO: 68 % (ref 43–75)
NRBC BLD AUTO-RTO: 0 /100 WBCS
PLATELET # BLD AUTO: 214 THOUSANDS/UL (ref 149–390)
PMV BLD AUTO: 10.3 FL (ref 8.9–12.7)
POTASSIUM SERPL-SCNC: 4.2 MMOL/L (ref 3.5–5.3)
PROCALCITONIN SERPL-MCNC: 0.19 NG/ML
PROT SERPL-MCNC: 7.8 G/DL (ref 6.4–8.2)
PROTHROMBIN TIME: 13.2 SECONDS (ref 11.6–14.5)
RBC # BLD AUTO: 4.43 MILLION/UL (ref 3.81–5.12)
SODIUM SERPL-SCNC: 133 MMOL/L (ref 136–145)
WBC # BLD AUTO: 5.75 THOUSAND/UL (ref 4.31–10.16)

## 2020-07-18 PROCEDURE — 36415 COLL VENOUS BLD VENIPUNCTURE: CPT | Performed by: INTERNAL MEDICINE

## 2020-07-18 PROCEDURE — 85025 COMPLETE CBC W/AUTO DIFF WBC: CPT | Performed by: INTERNAL MEDICINE

## 2020-07-18 PROCEDURE — 71045 X-RAY EXAM CHEST 1 VIEW: CPT

## 2020-07-18 PROCEDURE — 87040 BLOOD CULTURE FOR BACTERIA: CPT | Performed by: INTERNAL MEDICINE

## 2020-07-18 PROCEDURE — 83605 ASSAY OF LACTIC ACID: CPT | Performed by: INTERNAL MEDICINE

## 2020-07-18 PROCEDURE — 96366 THER/PROPH/DIAG IV INF ADDON: CPT

## 2020-07-18 PROCEDURE — 99285 EMERGENCY DEPT VISIT HI MDM: CPT

## 2020-07-18 PROCEDURE — 84145 PROCALCITONIN (PCT): CPT | Performed by: INTERNAL MEDICINE

## 2020-07-18 PROCEDURE — 93005 ELECTROCARDIOGRAM TRACING: CPT

## 2020-07-18 PROCEDURE — 85610 PROTHROMBIN TIME: CPT | Performed by: INTERNAL MEDICINE

## 2020-07-18 PROCEDURE — 80053 COMPREHEN METABOLIC PANEL: CPT | Performed by: INTERNAL MEDICINE

## 2020-07-18 PROCEDURE — 85730 THROMBOPLASTIN TIME PARTIAL: CPT | Performed by: INTERNAL MEDICINE

## 2020-07-18 PROCEDURE — 99284 EMERGENCY DEPT VISIT MOD MDM: CPT | Performed by: EMERGENCY MEDICINE

## 2020-07-18 PROCEDURE — 96365 THER/PROPH/DIAG IV INF INIT: CPT

## 2020-07-18 RX ORDER — SODIUM CHLORIDE, SODIUM GLUCONATE, SODIUM ACETATE, POTASSIUM CHLORIDE, MAGNESIUM CHLORIDE, SODIUM PHOSPHATE, DIBASIC, AND POTASSIUM PHOSPHATE .53; .5; .37; .037; .03; .012; .00082 G/100ML; G/100ML; G/100ML; G/100ML; G/100ML; G/100ML; G/100ML
1000 INJECTION, SOLUTION INTRAVENOUS ONCE
Status: COMPLETED | OUTPATIENT
Start: 2020-07-18 | End: 2020-07-18

## 2020-07-18 RX ORDER — SODIUM CHLORIDE, SODIUM GLUCONATE, SODIUM ACETATE, POTASSIUM CHLORIDE, MAGNESIUM CHLORIDE, SODIUM PHOSPHATE, DIBASIC, AND POTASSIUM PHOSPHATE .53; .5; .37; .037; .03; .012; .00082 G/100ML; G/100ML; G/100ML; G/100ML; G/100ML; G/100ML; G/100ML
1000 INJECTION, SOLUTION INTRAVENOUS ONCE
Status: DISCONTINUED | OUTPATIENT
Start: 2020-07-18 | End: 2020-07-18

## 2020-07-18 RX ADMIN — SODIUM CHLORIDE, SODIUM GLUCONATE, SODIUM ACETATE, POTASSIUM CHLORIDE, MAGNESIUM CHLORIDE, SODIUM PHOSPHATE, DIBASIC, AND POTASSIUM PHOSPHATE 1000 ML: .53; .5; .37; .037; .03; .012; .00082 INJECTION, SOLUTION INTRAVENOUS at 14:55

## 2020-07-18 NOTE — ED ATTENDING ATTESTATION
Final Diagnosis:  1  Low energy    2  Pneumonia           I, Karla Landis MD, saw and evaluated the patient  All available labs and X-rays were ordered by me or the resident and have been reviewed by myself  I discussed the patient with the resident / non-physician and agree with the resident's / non-physician practitioner's findings and plan as documented in the resident's / non-physician practicitioner's note, except where noted  At this point, I agree with the current assessment done in the ED  I was present during key portions of all procedures performed unless otherwise stated  Chief Complaint   Patient presents with    Altered Mental Status     Patient coming from [facility] secured dementia unit  As per facility pt  is normally screaming at people and has a tendency to hit  Today pt  was staring out into space  Pt  states, I was just waiting for lunch, they always seem to send me right before lunch"  This is a 80 y o  female presenting for evaluation of altered mental status  She is from a nursing home  She is normally extremely aggressive towards staff, screaming at people  She has tense a hit people as well  It seemed that she was little bit off today, in that she was not as aggressive  There is concern for UTI maybe 3 days ago so was started on ciprofloxacin  She has been compliant with this but [facility] systems from the patient is very limited  Anyways due to the altered mental status she was sent in for evaluation  No fevers  No vomiting  No change in oral intake, she wants C launch, she feels that she often gets sent here before lunch  No rashes  No cough congestion  She is DNR DNI, demented at baseline and is currently at baseline per son at bedside       PMH:   has a past medical history of A-fib ([facility] [address] ), CKD (chronic kidney disease), COPD (chronic obstructive pulmonary disease) ([facility] [address] ), Dementia ([facility] [address] ), Hypertension, Urinary tract infection, and Uterine prolapse  PSH:   has no past surgical history on file  Social:  Social History     Substance and Sexual Activity   Alcohol Use Never    Frequency: Never     Social History     Tobacco Use   Smoking Status Former Smoker   Smokeless Tobacco Never Used     Social History     Substance and Sexual Activity   Drug Use No     PE:  Vitals:    07/18/20 1630 07/18/20 1730 07/18/20 1830 07/18/20 1930   BP: (!) 177/77 (!) 182/131 (!) 196/82 (!) 190/87   BP Location: Right arm Right arm Right arm    Pulse: 76 72 80 92   Resp: 18 18 18 20   SpO2: 94% 94% 94% 99%   Weight:       General: VSS, NAD, awake, alert  Well-nourished, well-developed  Appears stated age  Head: Normocephalic, atraumatic, nontender  Eyes: PERRL, EOM-I  No diplopia  No hyphema  No subconjunctival hemorrhages  Symmetrical lids  ENTAtraumatic external nose and ears  MMM  No stridor  Normal phonation  No drooling  Base of mouth is soft  No mastoid tenderness  Neck: Symmetric, trachea midline  No JVD  CV: Peripheral pulses +2 throughout  No chest wall tenderness  Lungs:   Unlabored   No retractions  No crepitus  No tachypnea  No paradoxical motion  Abd: +BS, soft, NT/ND  No guarding  No rigidity  No rebound  No peritoneal signs  MSK:   FROM   Trace RIGHT lower extremity edema, no LEFT lower extremit y  Back:   No CVAT  Skin: Dry, intact  Neuro: awake  Alert  GCS 15, CN II-XII grossly intact  Motor grossly intact  Psychiatric/Behavioral: Appropriate mood and affect   Exam: deferred  A:  - AMS? P:  - Will do labs, fluids, urine  - likely DC  - no signs of head trauma, I don't think a CTH is needed  - 13 point ROS was performed and all are normal unless stated in the history above  - Nursing note reviewed  Vitals reviewed     - Orders placed by myself and/or advanced practitioner / resident     - Previous chart was reviewed  - No language barrier    - History obtained from patient, NH, son  - There are no limitations to the history obtained  - Critical care time: Not applicable for this patient  Code Status: Prior  Advance Directive and Living Will: Yes    Power of :    POLST:      Medications   multi-electrolyte (ISOLYTE-S PH 7 4) bolus 1,000 mL (0 mL Intravenous Stopped 7/18/20 1937)     XR chest 1 view portable   ED Interpretation   1 view   No obvious pna      Final Result         1  Increased parenchymal density in the right upper lobe  This is concerning for pneumonia  The study was marked in EPIC for significant notification              Workstation performed: [id number]           Orders Placed This Encounter   Procedures    Blood culture #1    Blood culture #2    XR chest 1 view portable    CBC and differential    Comprehensive metabolic panel    Lactic Acid    Protime-INR    APTT    Procalcitonin    EKG RESULTS    ECG 12 lead     Labs Reviewed   CBC AND DIFFERENTIAL - Abnormal       Result Value Ref Range Status    WBC 5 75  4 31 - 10 16 Thousand/uL Final    RBC 4 43  3 81 - 5 12 Million/uL Final    Hemoglobin 13 5  11 5 - 15 4 g/dL Final    Hematocrit 42 3  34 8 - 46 1 % Final    MCV 96  82 - 98 fL Final    MCH 30 5  26 8 - 34 3 pg Final    MCHC 31 9  31 4 - 37 4 g/dL Final    RDW 13 6  11 6 - 15 1 % Final    MPV 10 3  8 9 - 12 7 fL Final    Platelets 242  004 - 390 Thousands/uL Final    nRBC 0  /100 WBCs Final    Neutrophils Relative 68  43 - 75 % Final    Immat GRANS % 0  0 - 2 % Final    Lymphocytes Relative 11 (*) 14 - 44 % Final    Monocytes Relative 21 (*) 4 - 12 % Final    Eosinophils Relative 0  0 - 6 % Final    Basophils Relative 0  0 - 1 % Final    Neutrophils Absolute 3 86  1 85 - 7 62 Thousands/µL Final    Immature Grans Absolute 0 02  0 00 - 0 20 Thousand/uL Final    Lymphocytes Absolute 0 62  0 60 - 4 47 Thousands/µL Final    Monocytes Absolute 1 21  0 17 - 1 22 Thousand/µL Final    Eosinophils Absolute 0 02  0 00 - 0 61 Thousand/µL Final    Basophils Absolute 0  02  0 00 - 0 10 Thousands/µL Final   COMPREHENSIVE METABOLIC PANEL - Abnormal    Sodium 133 (*) 136 - 145 mmol/L Final    Potassium 4 2  3 5 - 5 3 mmol/L Final    Chloride 103  100 - 108 mmol/L Final    CO2 23  21 - 32 mmol/L Final    ANION GAP 7  4 - 13 mmol/L Final    BUN 28 (*) 5 - 25 mg/dL Final    Creatinine 1 72 (*) 0 60 - 1 30 mg/dL Final    Comment: Standardized to IDMS reference method    Glucose 147 (*) 65 - 140 mg/dL Final    Comment:   If the patient is fasting, the ADA then defines impaired fasting glucose as > 100 mg/dL and diabetes as > or equal to 123 mg/dL  Specimen collection should occur prior to Sulfasalazine administration due to the potential for falsely depressed results  Specimen collection should occur prior to Sulfapyridine administration due to the potential for falsely elevated results  Calcium 8 3  8 3 - 10 1 mg/dL Final    AST 27  5 - 45 U/L Final    Comment:   Specimen collection should occur prior to Sulfasalazine administration due to the potential for falsely depressed results  ALT 20  12 - 78 U/L Final    Comment:   Specimen collection should occur prior to Sulfasalazine and/or Sulfapyridine administration due to the potential for falsely depressed results  Alkaline Phosphatase 76  46 - 116 U/L Final    Total Protein 7 8  6 4 - 8 2 g/dL Final    Albumin 3 2 (*) 3 5 - 5 0 g/dL Final    Total Bilirubin 0 35  0 20 - 1 00 mg/dL Final    Comment:   Use of this assay is not recommended for patients undergoing treatment with eltrombopag due to the potential for falsely elevated results      eGFR 26  ml/min/1 73sq m Final    Narrative:     [place] guidelines for Chronic Kidney Disease (CKD):     Stage 1 with normal or high GFR (GFR > 90 mL/min/1 73 square meters)    Stage 2 Mild CKD (GFR = 60-89 mL/min/1 73 square meters)    Stage 3A Moderate CKD (GFR = 45-59 mL/min/1 73 square meters)    Stage 3B Moderate CKD (GFR = 30-44 mL/min/1 73 square meters)    Stage 4 Severe CKD (GFR = 15-29 mL/min/1 73 square meters)    Stage 5 End Stage CKD (GFR <15 mL/min/1 73 square meters)  Note: GFR calculation is accurate only with a steady state creatinine   LACTIC ACID, PLASMA - Normal    LACTIC ACID 1 9  0 5 - 2 0 mmol/L Final    Narrative:     Result may be elevated if tourniquet was used during collection  PROTIME-INR - Normal    Protime 13 2  11 6 - 14 5 seconds Final    INR 1 00  0 84 - 1 19 Final   APTT - Normal    PTT 31  23 - 37 seconds Final    Comment: Therapeutic Heparin Range =  60-90 seconds   PROCALCITONIN TEST - Normal    Procalcitonin 0 19  <=0 25 ng/ml Final    Comment: Suspected Lower Respiratory Tract Infection (LRTI):  - LESS than or EQUAL to 0 25 ng/mL:   low likelihood for bacterial LRTI; antibiotics DISCOURAGED   - GREATER than 0 25 ng/mL:   increased likelihood for bacterial LRTI; antibiotics ENCOURAGED  Suspected Sepsis:  - Strongly consider initiating antibiotics in ALL UNSTABLE patients  - LESS than or EQUAL to 0 5 ng/mL:   low likelihood for bacterial sepsis; antibiotics DISCOURAGED   - GREATER than 0 5 ng/mL:   increased likelihood for bacterial sepsis; antibiotics ENCOURAGED   - GREATER than 2 ng/mL:   high risk for severe sepsis / septic shock; antibiotics strongly ENCOURAGED  Decisions on antibiotic use should not be based solely on Procalcitonin (PCT) levels  If PCT is low but uncertainty exists with stopping antibiotics, repeat PCT in 6-24 hours to confirm the low level  If antibiotics are administered (regardless if initial PCT was high or low), repeat PCT every 1-2 days to consider early antibiotic cessation (when GREATER than 80% decrease from the peak OR when PCT drops below designated cutoffs, whichever comes first), so long as the infection is NOT one that typically requires prolonged treatment durations (e g , bone/joint infections, endocarditis, Staph  aureus bacteremia)      Situations of FALSE-POSITIVE Procalcitonin values:  1) Newborns < 67 hours old  2) Massive stress from severe trauma / burns, major surgery, acute pancreatitis, cardiogenic / hemorrhagic shock, sickle cell crisis, or other organ perfusion abnormalities  3) Malaria and some Candidal infections  4) Treatment with agents that stimulate cytokines (e g , OKT3, anti-lymphocyte globulins, alemtuzumab, IL-2, granulocyte transfusion [NOT GCSFs])  5) Chronic renal disease causes elevated baseline levels (consider GREATER than 0 75 ng/mL as an abnormal cut-off); initiating HD/CRRT may cause transient decreases  6) Paraneoplastic syndromes from medullary thyroid or SCLC, some forms of vasculitis, and acute oxzes-yj-oejx disease    Situations of FALSE-NEGATIVE Procalcitonin values:  1) Too early in clinical course for PCT to have reached its peak (may repeat in 6-24 hours to confirm low level)  2) Localized infection WITHOUT systemic (SIRS / sepsis) response (e g , an abscess, osteomyelitis, cystitis)  3) Mycobacteria (e g , Tuberculosis, MAC)  4) Cystic fibrosis exacerbations     BLOOD CULTURE    Blood Culture Received in Microbiology Lab  Culture in Progress  Preliminary   BLOOD CULTURE    Blood Culture Received in Microbiology Lab  Culture in Progress  Preliminary     Time reflects when diagnosis was documented in both MDM as applicable and the Disposition within this note     Time User Action Codes Description Comment    7/18/2020  4:44 PM Ranulfo Sotomayor Add [R53 83] Low energy     7/19/2020  9:23 AM Isamar Hernandez Add [J18 9] Pneumonia       ED Disposition     ED Disposition Condition Date/Time Comment    Discharge Stable Sat Jul 18, 2020  4:44 PM Stan Tena discharge to home/self care  Follow-up Information     Follow up With Specialties Details Why Contact Info    Lethea Baumgarten, DO Family Medicine Schedule an appointment as soon as possible for a visit   [address] [place] [address]    [address]  [id number] [facility]          Discharge Medication List as of 7/18/2020  4:45 PM      CONTINUE these medications which have NOT CHANGED    Details   acetaminophen (TYLENOL) 500 mg tablet Take 500 mg by mouth every 6 (six) hours as needed for mild pain, Historical Med      haloperidol (HALDOL) 0 5 mg tablet Take 0 5 mg by mouth every 6 (six) hours as needed for agitation, Historical Med      hyoscyamine (LEVSIN/SL) 0 125 mg SL tablet Take 0 125 mg by mouth every 6 (six) hours as needed for cramping, Historical Med      lisinopril (ZESTRIL) 5 mg tablet Take 0 5 tablets (2 5 mg total) by mouth daily, Starting Tue 3/5/2019, Normal      loperamide (IMODIUM) 2 mg capsule Take 2 mg by mouth as needed for diarrhea Take 1 dose after each bowel movement *max dose 16mg, Historical Med      magnesium hydroxide (MILK OF MAGNESIA) 400 mg/5 mL oral suspension Take by mouth daily as needed for constipation, Historical Med      metoprolol tartrate (LOPRESSOR) 25 mg tablet Take 25 mg by mouth every 12 (twelve) hours, Historical Med      prochlorperazine (COMPAZINE) 10 mg tablet Take 10 mg by mouth every 6 (six) hours as needed for nausea or vomiting, Historical Med           No discharge procedures on file  Prior to Admission Medications   Prescriptions Last Dose Informant Patient Reported?  Taking?   acetaminophen (TYLENOL) 500 mg tablet   Yes No   Sig: Take 500 mg by mouth every 6 (six) hours as needed for mild pain   haloperidol (HALDOL) 0 5 mg tablet   Yes No   Sig: Take 0 5 mg by mouth every 6 (six) hours as needed for agitation   hyoscyamine (LEVSIN/SL) 0 125 mg SL tablet   Yes No   Sig: Take 0 125 mg by mouth every 6 (six) hours as needed for cramping   lisinopril (ZESTRIL) 5 mg tablet   No No   Sig: Take 0 5 tablets (2 5 mg total) by mouth daily   loperamide (IMODIUM) 2 mg capsule   Yes No   Sig: Take 2 mg by mouth as needed for diarrhea Take 1 dose after each bowel movement *max dose 16mg   magnesium hydroxide (MILK OF MAGNESIA) 400 mg/5 mL oral suspension   Yes No   Sig: Take by mouth daily as needed for constipation   metoprolol tartrate (LOPRESSOR) 25 mg tablet   Yes No   Sig: Take 25 mg by mouth every 12 (twelve) hours   prochlorperazine (COMPAZINE) 10 mg tablet   Yes No   Sig: Take 10 mg by mouth every 6 (six) hours as needed for nausea or vomiting      Facility-Administered Medications: None       Portions of the record may have been created with voice recognition software  Occasional wrong word or "sound a like" substitutions may have occurred due to the inherent limitations of voice recognition software  Read the chart carefully and recognize, using context, where substitutions have occurred      Electronically signed by:  Tatianna Ward

## 2020-07-18 NOTE — DISCHARGE INSTRUCTIONS
Patient seen in the ED  Chest x-ray was unremarkable from previous x-rays  Procalcitonin normal   White count normal   Lactic acid normal   With these lab values patient's vital sign we have very low suspicion sepsis  Patient discharge in stable condition

## 2020-07-18 NOTE — ED NOTES
[place] [facility] to pick pt up at [facility]  07/18/20 2073

## 2020-07-18 NOTE — ED PROVIDER NOTES
History  Chief Complaint   Patient presents with    Altered Mental Status     Patient coming from [facility] secured dementia unit  As per facility pt  is normally screaming at people and has a tendency to hit  Today pt  was staring out into space  Pt  states, I was just waiting for lunch, they always seem to send me right before lunch"  HPI  80-year-old female presents via EMS from nursing home evaluation altered mental status and urinary tract infection  Patient has dementia and is oriented to self only  The patient denies any pain  I called the nursing home and the nurse reports that the patient has been more fatigued than usual, has low energy, unable to walk as often as usual and is requiring a wheelchair at times, is urinating frequency and complaining of abdominal pain  The nurse reports she has not had any falls  They do not report a cough, congestion or fevers  They do report concern for recently diagnosed UTI since reportedly she has failed UTI antibiotic treatment in the past   Patient's code status is DNR  Prior to Admission Medications   Prescriptions Last Dose Informant Patient Reported?  Taking?   acetaminophen (TYLENOL) 500 mg tablet   Yes No   Sig: Take 500 mg by mouth every 6 (six) hours as needed for mild pain   haloperidol (HALDOL) 0 5 mg tablet   Yes No   Sig: Take 0 5 mg by mouth every 6 (six) hours as needed for agitation   hyoscyamine (LEVSIN/SL) 0 125 mg SL tablet   Yes No   Sig: Take 0 125 mg by mouth every 6 (six) hours as needed for cramping   lisinopril (ZESTRIL) 5 mg tablet   No No   Sig: Take 0 5 tablets (2 5 mg total) by mouth daily   loperamide (IMODIUM) 2 mg capsule   Yes No   Sig: Take 2 mg by mouth as needed for diarrhea Take 1 dose after each bowel movement *max dose 16mg   magnesium hydroxide (MILK OF MAGNESIA) 400 mg/5 mL oral suspension   Yes No   Sig: Take by mouth daily as needed for constipation   metoprolol tartrate (LOPRESSOR) 25 mg tablet   Yes No Sig: Take 25 mg by mouth every 12 (twelve) hours   prochlorperazine (COMPAZINE) 10 mg tablet   Yes No   Sig: Take 10 mg by mouth every 6 (six) hours as needed for nausea or vomiting      Facility-Administered Medications: None       Past Medical History:   Diagnosis Date    A-fib ([facility] [address] )     CKD (chronic kidney disease)     COPD (chronic obstructive pulmonary disease) (HCC)     Dementia (HCC)     Hypertension     Urinary tract infection     Uterine prolapse        History reviewed  No pertinent surgical history  Family History   Problem Relation Age of Onset    No Known Problems Father     No Known Problems Mother      I have reviewed and agree with the history as documented      E-Cigarette/Vaping    E-Cigarette Use Never User      E-Cigarette/Vaping Substances     Social History     Tobacco Use    Smoking status: Former Smoker    Smokeless tobacco: Never Used   Substance Use Topics    Alcohol use: Never     Frequency: Never    Drug use: No        Review of Systems   Unable to perform ROS: Dementia        Physical Exam  ED Triage Vitals [07/18/20 1358]   Temp Pulse Respirations Blood Pressure SpO2   -- 70 18 (!) 172/76 96 %      Temp src Heart Rate Source Patient Position - Orthostatic VS BP Location FiO2 (%)   -- Monitor Lying Right arm --      Pain Score       --             Orthostatic Vital Signs  Vitals:    07/18/20 1358 07/18/20 1459 07/18/20 1608 07/18/20 1630   BP: (!) 172/76 (!) 176/83 (!) 181/85 (!) 177/77   Pulse: 70 66 72 76   Patient Position - Orthostatic VS: Lying Lying Lying Lying       Physical Exam   Constitutional:  Well developed, well nourished, resting in bed comfortably     HEENT:  Conjunctiva normal  Oropharynx moist  Respiratory:  No respiratory distress, normal breath sounds  Cardiovascular:  Normal rate, normal rhythm, no murmurs  GI:  Soft, nondistended, normal bowel sounds, nontender  :  No costovertebral angle tenderness   Musculoskeletal:  No edema, no tenderness, no deformities  Integument:  Well hydrated, no rash   Lymphatic:  No lymphadenopathy noted   Neurologic:  Alert & oriented to person only, normal motor function, normal sensory function, no focal deficits noted   Psychiatric:  Dementia, calm, no aggressive behavior       ED Medications  Medications   multi-electrolyte (ISOLYTE-S PH 7 4) bolus 1,000 mL (1,000 mL Intravenous New Bag 7/18/20 1455)     Followed by   multi-electrolyte (ISOLYTE-S PH 7 4 equivalent) IV solution 1,000 mL (has no administration in time range)       Diagnostic Studies  Results Reviewed     Procedure Component Value Units Date/Time    Procalcitonin [[id number]]  (Normal) Collected:  07/18/20 1432    Lab Status:  Final result Specimen:  Blood from Arm, Left Updated:  07/18/20 1634     Procalcitonin 0 19 ng/ml     Lactic Acid [[id number]]  (Normal) Collected:  07/18/20 1432    Lab Status:  Final result Specimen:  Blood from Arm, Left Updated:  07/18/20 1530     LACTIC ACID 1 9 mmol/L     Narrative:       Result may be elevated if tourniquet was used during collection      Protime-INR [[id number]]  (Normal) Collected:  07/18/20 1432    Lab Status:  Final result Specimen:  Blood from Arm, Left Updated:  07/18/20 1530     Protime 13 2 seconds      INR 1 00    APTT [[id number]]  (Normal) Collected:  07/18/20 1432    Lab Status:  Final result Specimen:  Blood from Arm, Left Updated:  07/18/20 1530     PTT 31 seconds     Comprehensive metabolic panel [[id number]]  (Abnormal) Collected:  07/18/20 1432    Lab Status:  Final result Specimen:  Blood from Arm, Left Updated:  07/18/20 1529     Sodium 133 mmol/L      Potassium 4 2 mmol/L      Chloride 103 mmol/L      CO2 23 mmol/L      ANION GAP 7 mmol/L      BUN 28 mg/dL      Creatinine 1 72 mg/dL      Glucose 147 mg/dL      Calcium 8 3 mg/dL      AST 27 U/L      ALT 20 U/L      Alkaline Phosphatase 76 U/L      Total Protein 7 8 g/dL      Albumin 3 2 g/dL      Total Bilirubin 0 35 mg/dL      eGFR 26 ml/min/1 73sq m     Narrative:       National Kid[employer] Disease Foundation guidelines for Chronic Kidney Disease (CKD):     Stage 1 with normal or high GFR (GFR > 90 mL/min/1 73 square meters)    Stage 2 Mild CKD (GFR = 60-89 mL/min/1 73 square meters)    Stage 3A Moderate CKD (GFR = 45-59 mL/min/1 73 square meters)    Stage 3B Moderate CKD (GFR = 30-44 mL/min/1 73 square meters)    Stage 4 Severe CKD (GFR = 15-29 mL/min/1 73 square meters)    Stage 5 End Stage CKD (GFR <15 mL/min/1 73 square meters)  Note: GFR calculation is accurate only with a steady state creatinine    CBC and differential [[id number]]  (Abnormal) Collected:  07/18/20 1432    Lab Status:  Final result Specimen:  Blood from Arm, Left Updated:  07/18/20 1447     WBC 5 75 Thousand/uL      RBC 4 43 Million/uL      Hemoglobin 13 5 g/dL      Hematocrit 42 3 %      MCV 96 fL      MCH 30 5 pg      MCHC 31 9 g/dL      RDW 13 6 %      MPV 10 3 fL      Platelets 910 Thousands/uL      nRBC 0 /100 WBCs      Neutrophils Relative 68 %      Immat GRANS % 0 %      Lymphocytes Relative 11 %      Monocytes Relative 21 %      Eosinophils Relative 0 %      Basophils Relative 0 %      Neutrophils Absolute 3 86 Thousands/µL      Immature Grans Absolute 0 02 Thousand/uL      Lymphocytes Absolute 0 62 Thousands/µL      Monocytes Absolute 1 21 Thousand/µL      Eosinophils Absolute 0 02 Thousand/µL      Basophils Absolute 0 02 Thousands/µL     Blood culture #1 [[id number]] Collected:  07/18/20 1433    Lab Status: In process Specimen:  Blood from Arm, Right Updated:  07/18/20 1440    Blood culture #2 [[id number]] Collected:  07/18/20 1432    Lab Status: In process Specimen:  Blood from Arm, Left Updated:  07/18/20 1440                 XR chest 1 view portable    (Results Pending)         Procedures  Procedures      ED Course       US AUDIT      Most Recent Value   Initial Alcohol Screen: US AUDIT-C    1   How often do you have a drink containing alcohol?  0 Filed at: 07/18/2020 7383 2  How many drinks containing alcohol do you have on a typical day you are drinking? 0 Filed at: 07/18/2020 1359   3a  Male UNDER 65: How often do you have five or more drinks on one occasion? 0 Filed at: 07/18/2020 1359   3b  FEMALE Any Age, or MALE 65+: How often do you have 4 or more drinks on one occassion? 0 Filed at: 07/18/2020 1359   Audit-C Score  0 Filed at: 07/18/2020 1359              Identification of Seniors at Risk      Most Recent Value   (ISAR) Identification of Seniors at Risk   Before the illness or injury that brought you to the Emergency, did you need someone to help you on a regular basis? 1 Filed at: 07/18/2020 1400   In the last 24 hours, have you needed more help than usual?  1 Filed at: 07/18/2020 1400   Have you been hospitalized for one or more nights during the past 6 months? 1 Filed at: 07/18/2020 1400   In general, do you see well?  0 Filed at: 07/18/2020 1400   In general, do you have serious problems with your memory? 1 Filed at: 07/18/2020 1400   Do you take more than three different medications every day? 1 Filed at: 07/18/2020 1400   ISAR Score  5 Filed at: 07/18/2020 1400          WILLIAM/DAST-10      Most Recent Value   How many times in the past year have you    Used an illegal drug or used a prescription medication for non-medical reasons? Never Filed at: 07/18/2020 1359                              MDM  Number of Diagnoses or Management Options  Low energy:   Diagnosis management comments: 25-year-old female presents via EMS from nursing home evaluation altered mental status and urinary tract infection  -VSS   -Septic workup showed Chest x-ray was unremarkable from previous x-rays  Procalcitonin normal   White count normal   Lactic acid normal  Very low suspicion sepsis  Continue antibiotics for UTI as per primary  Cr elevated but not from baseline  Patient discharge in stable condition         Amount and/or Complexity of Data Reviewed  Clinical lab tests: ordered and reviewed  Tests in the radiology section of CPT®: ordered and reviewed  Discussion of test results with the performing providers: yes  Review and summarize past medical records: yes  Discuss the patient with other providers: yes    Risk of Complications, Morbidity, and/or Mortality  Presenting problems: moderate  Diagnostic procedures: moderate  Management options: low    Patient Progress  Patient progress: stable        Disposition  Final diagnoses:   Low energy     Time reflects when diagnosis was documented in both MDM as applicable and the Disposition within this note     Time User Action Codes Description Comment    7/18/2020  4:44 PM Maple Peppers Add [R53 [id number]] Low energy       ED Disposition     ED Disposition Condition Date/Time Comment    Discharge Stable Sat Jul 18, 2020  4:44 PM Clarice Heart discharge to home/self care  Follow-up Information     Follow up With Specialties Details Why Contact Info    Mary Nino,  Family Medicine Schedule an appointment as soon as possible for a visit   [address] [place] [address] Road  [phone]            Patient's Medications   Discharge Prescriptions    No medications on file     No discharge procedures on file  PDMP Review     None           ED Provider  Attending physically available and evaluated Clarice Heart I managed the patient along with the ED Attending      Electronically Signed by         Jil Shultz MD  07/18/20 3355

## 2020-07-19 LAB
ATRIAL RATE: 394 BPM
QRS AXIS: -5 DEGREES
QRSD INTERVAL: 74 MS
QT INTERVAL: 362 MS
QTC INTERVAL: 398 MS
T WAVE AXIS: 35 DEGREES
VENTRICULAR RATE: 73 BPM

## 2020-07-19 PROCEDURE — 93010 ELECTROCARDIOGRAM REPORT: CPT | Performed by: INTERNAL MEDICINE

## 2020-07-19 RX ORDER — AZITHROMYCIN 250 MG/1
TABLET, FILM COATED ORAL
Qty: 5 TABLET | Refills: 0 | Status: SHIPPED | OUTPATIENT
Start: 2020-07-19 | End: 2020-07-23

## 2020-07-19 NOTE — RESULT ENCOUNTER NOTE
Pt at [facility] - discussed w pt nurse - zithromax sent over to pharmacy  Instructions reviewed  Discussed importance of close FU and reasons to return to the ED

## 2020-07-23 LAB
BACTERIA BLD CULT: NORMAL
BACTERIA BLD CULT: NORMAL

## 2021-02-12 DIAGNOSIS — Z23 ENCOUNTER FOR IMMUNIZATION: ICD-10-CM
